# Patient Record
Sex: FEMALE | Race: BLACK OR AFRICAN AMERICAN | NOT HISPANIC OR LATINO | URBAN - METROPOLITAN AREA
[De-identification: names, ages, dates, MRNs, and addresses within clinical notes are randomized per-mention and may not be internally consistent; named-entity substitution may affect disease eponyms.]

---

## 2019-10-25 ENCOUNTER — INPATIENT (INPATIENT)
Facility: HOSPITAL | Age: 24
LOS: 5 days | Discharge: HOME | End: 2019-10-31
Attending: STUDENT IN AN ORGANIZED HEALTH CARE EDUCATION/TRAINING PROGRAM | Admitting: STUDENT IN AN ORGANIZED HEALTH CARE EDUCATION/TRAINING PROGRAM
Payer: SUBSIDIZED

## 2019-10-25 VITALS
SYSTOLIC BLOOD PRESSURE: 137 MMHG | WEIGHT: 179.9 LBS | RESPIRATION RATE: 19 BRPM | TEMPERATURE: 97 F | HEIGHT: 68 IN | OXYGEN SATURATION: 99 % | DIASTOLIC BLOOD PRESSURE: 84 MMHG | HEART RATE: 110 BPM

## 2019-10-25 LAB
ALBUMIN SERPL ELPH-MCNC: 4.1 G/DL — SIGNIFICANT CHANGE UP (ref 3.5–5.2)
ALP SERPL-CCNC: 70 U/L — SIGNIFICANT CHANGE UP (ref 30–115)
ALT FLD-CCNC: 10 U/L — SIGNIFICANT CHANGE UP (ref 0–41)
ANION GAP SERPL CALC-SCNC: 13 MMOL/L — SIGNIFICANT CHANGE UP (ref 7–14)
APTT BLD: 30.4 SEC — SIGNIFICANT CHANGE UP (ref 27–39.2)
AST SERPL-CCNC: 17 U/L — SIGNIFICANT CHANGE UP (ref 0–41)
BILIRUB SERPL-MCNC: 0.6 MG/DL — SIGNIFICANT CHANGE UP (ref 0.2–1.2)
BUN SERPL-MCNC: 9 MG/DL — LOW (ref 10–20)
CALCIUM SERPL-MCNC: 9.1 MG/DL — SIGNIFICANT CHANGE UP (ref 8.5–10.1)
CHLORIDE SERPL-SCNC: 100 MMOL/L — SIGNIFICANT CHANGE UP (ref 98–110)
CO2 SERPL-SCNC: 24 MMOL/L — SIGNIFICANT CHANGE UP (ref 17–32)
CREAT SERPL-MCNC: 0.6 MG/DL — LOW (ref 0.7–1.5)
D DIMER BLD IA.RAPID-MCNC: 779 NG/ML DDU — HIGH (ref 0–230)
ERYTHROCYTE [SEDIMENTATION RATE] IN BLOOD: 128 MM/HR — HIGH (ref 0–20)
GLUCOSE SERPL-MCNC: 89 MG/DL — SIGNIFICANT CHANGE UP (ref 70–99)
HCT VFR BLD CALC: 27.8 % — LOW (ref 37–47)
HGB BLD-MCNC: 8.6 G/DL — LOW (ref 12–16)
INR BLD: 1.62 RATIO — HIGH (ref 0.65–1.3)
LIDOCAIN IGE QN: 9 U/L — SIGNIFICANT CHANGE UP (ref 7–60)
MAGNESIUM SERPL-MCNC: 2 MG/DL — SIGNIFICANT CHANGE UP (ref 1.8–2.4)
MCHC RBC-ENTMCNC: 23 PG — LOW (ref 27–31)
MCHC RBC-ENTMCNC: 30.9 G/DL — LOW (ref 32–37)
MCV RBC AUTO: 74.3 FL — LOW (ref 81–99)
NRBC # BLD: 0 /100 WBCS — SIGNIFICANT CHANGE UP (ref 0–0)
NT-PROBNP SERPL-SCNC: 86 PG/ML — SIGNIFICANT CHANGE UP (ref 0–300)
PLATELET # BLD AUTO: 263 K/UL — SIGNIFICANT CHANGE UP (ref 130–400)
POTASSIUM SERPL-MCNC: 3.6 MMOL/L — SIGNIFICANT CHANGE UP (ref 3.5–5)
POTASSIUM SERPL-SCNC: 3.6 MMOL/L — SIGNIFICANT CHANGE UP (ref 3.5–5)
PROT SERPL-MCNC: 9.5 G/DL — HIGH (ref 6–8)
PROTHROM AB SERPL-ACNC: 18.5 SEC — HIGH (ref 9.95–12.87)
RBC # BLD: 3.74 M/UL — LOW (ref 4.2–5.4)
RBC # FLD: 15.5 % — HIGH (ref 11.5–14.5)
SODIUM SERPL-SCNC: 137 MMOL/L — SIGNIFICANT CHANGE UP (ref 135–146)
TROPONIN T SERPL-MCNC: <0.01 NG/ML — SIGNIFICANT CHANGE UP
TROPONIN T SERPL-MCNC: <0.01 NG/ML — SIGNIFICANT CHANGE UP
WBC # BLD: 7.95 K/UL — SIGNIFICANT CHANGE UP (ref 4.8–10.8)
WBC # FLD AUTO: 7.95 K/UL — SIGNIFICANT CHANGE UP (ref 4.8–10.8)

## 2019-10-25 PROCEDURE — 99285 EMERGENCY DEPT VISIT HI MDM: CPT

## 2019-10-25 PROCEDURE — 71275 CT ANGIOGRAPHY CHEST: CPT | Mod: 26

## 2019-10-25 PROCEDURE — 93010 ELECTROCARDIOGRAM REPORT: CPT

## 2019-10-25 PROCEDURE — 71046 X-RAY EXAM CHEST 2 VIEWS: CPT | Mod: 26

## 2019-10-25 RX ORDER — COLCHICINE 0.6 MG
0.6 TABLET ORAL
Refills: 0 | Status: DISCONTINUED | OUTPATIENT
Start: 2019-10-25 | End: 2019-10-26

## 2019-10-25 RX ORDER — MORPHINE SULFATE 50 MG/1
2 CAPSULE, EXTENDED RELEASE ORAL ONCE
Refills: 0 | Status: DISCONTINUED | OUTPATIENT
Start: 2019-10-25 | End: 2019-10-25

## 2019-10-25 RX ORDER — CHLORHEXIDINE GLUCONATE 213 G/1000ML
1 SOLUTION TOPICAL
Refills: 0 | Status: DISCONTINUED | OUTPATIENT
Start: 2019-10-25 | End: 2019-10-31

## 2019-10-25 RX ORDER — SODIUM CHLORIDE 9 MG/ML
1000 INJECTION, SOLUTION INTRAVENOUS ONCE
Refills: 0 | Status: COMPLETED | OUTPATIENT
Start: 2019-10-25 | End: 2019-10-25

## 2019-10-25 RX ORDER — LANOLIN ALCOHOL/MO/W.PET/CERES
5 CREAM (GRAM) TOPICAL AT BEDTIME
Refills: 0 | Status: DISCONTINUED | OUTPATIENT
Start: 2019-10-25 | End: 2019-10-31

## 2019-10-25 RX ORDER — MORPHINE SULFATE 50 MG/1
4 CAPSULE, EXTENDED RELEASE ORAL ONCE
Refills: 0 | Status: DISCONTINUED | OUTPATIENT
Start: 2019-10-25 | End: 2019-10-25

## 2019-10-25 RX ORDER — IBUPROFEN 200 MG
400 TABLET ORAL EVERY 6 HOURS
Refills: 0 | Status: DISCONTINUED | OUTPATIENT
Start: 2019-10-25 | End: 2019-10-25

## 2019-10-25 RX ORDER — KETOROLAC TROMETHAMINE 30 MG/ML
30 SYRINGE (ML) INJECTION ONCE
Refills: 0 | Status: DISCONTINUED | OUTPATIENT
Start: 2019-10-25 | End: 2019-10-25

## 2019-10-25 RX ORDER — IBUPROFEN 200 MG
600 TABLET ORAL THREE TIMES A DAY
Refills: 0 | Status: DISCONTINUED | OUTPATIENT
Start: 2019-10-25 | End: 2019-10-25

## 2019-10-25 RX ORDER — ENOXAPARIN SODIUM 100 MG/ML
40 INJECTION SUBCUTANEOUS DAILY
Refills: 0 | Status: DISCONTINUED | OUTPATIENT
Start: 2019-10-25 | End: 2019-10-26

## 2019-10-25 RX ORDER — PANTOPRAZOLE SODIUM 20 MG/1
40 TABLET, DELAYED RELEASE ORAL
Refills: 0 | Status: DISCONTINUED | OUTPATIENT
Start: 2019-10-25 | End: 2019-10-28

## 2019-10-25 RX ORDER — INDOMETHACIN 50 MG
50 CAPSULE ORAL THREE TIMES A DAY
Refills: 0 | Status: DISCONTINUED | OUTPATIENT
Start: 2019-10-25 | End: 2019-10-31

## 2019-10-25 RX ORDER — KETOROLAC TROMETHAMINE 30 MG/ML
15 SYRINGE (ML) INJECTION EVERY 6 HOURS
Refills: 0 | Status: DISCONTINUED | OUTPATIENT
Start: 2019-10-25 | End: 2019-10-25

## 2019-10-25 RX ORDER — INFLUENZA VIRUS VACCINE 15; 15; 15; 15 UG/.5ML; UG/.5ML; UG/.5ML; UG/.5ML
0.5 SUSPENSION INTRAMUSCULAR ONCE
Refills: 0 | Status: DISCONTINUED | OUTPATIENT
Start: 2019-10-25 | End: 2019-10-31

## 2019-10-25 RX ORDER — ONDANSETRON 8 MG/1
4 TABLET, FILM COATED ORAL ONCE
Refills: 0 | Status: COMPLETED | OUTPATIENT
Start: 2019-10-25 | End: 2019-10-25

## 2019-10-25 RX ORDER — PANTOPRAZOLE SODIUM 20 MG/1
40 TABLET, DELAYED RELEASE ORAL
Refills: 0 | Status: DISCONTINUED | OUTPATIENT
Start: 2019-10-25 | End: 2019-10-25

## 2019-10-25 RX ADMIN — Medication 600 MILLIGRAM(S): at 20:50

## 2019-10-25 RX ADMIN — Medication 50 MILLIGRAM(S): at 22:36

## 2019-10-25 RX ADMIN — Medication 600 MILLIGRAM(S): at 20:13

## 2019-10-25 RX ADMIN — MORPHINE SULFATE 2 MILLIGRAM(S): 50 CAPSULE, EXTENDED RELEASE ORAL at 12:02

## 2019-10-25 RX ADMIN — Medication 50 MILLIGRAM(S): at 22:00

## 2019-10-25 RX ADMIN — ONDANSETRON 4 MILLIGRAM(S): 8 TABLET, FILM COATED ORAL at 20:14

## 2019-10-25 RX ADMIN — SODIUM CHLORIDE 1000 MILLILITER(S): 9 INJECTION, SOLUTION INTRAVENOUS at 13:02

## 2019-10-25 RX ADMIN — PANTOPRAZOLE SODIUM 40 MILLIGRAM(S): 20 TABLET, DELAYED RELEASE ORAL at 20:14

## 2019-10-25 RX ADMIN — Medication 30 MILLIGRAM(S): at 15:56

## 2019-10-25 RX ADMIN — MORPHINE SULFATE 4 MILLIGRAM(S): 50 CAPSULE, EXTENDED RELEASE ORAL at 14:37

## 2019-10-25 RX ADMIN — SODIUM CHLORIDE 1000 MILLILITER(S): 9 INJECTION, SOLUTION INTRAVENOUS at 14:37

## 2019-10-25 RX ADMIN — MORPHINE SULFATE 2 MILLIGRAM(S): 50 CAPSULE, EXTENDED RELEASE ORAL at 12:32

## 2019-10-25 RX ADMIN — SODIUM CHLORIDE 1000 MILLILITER(S): 9 INJECTION, SOLUTION INTRAVENOUS at 12:02

## 2019-10-25 NOTE — ED PROVIDER NOTE - ADDITIONAL RISK FACTOR FREE TEXT BOX
24f w pericarditis hx and chest pain concerning for recurrence of pericarditis. no resp distress. BP ok. Bedside POC-US w no tamponade physiology. Labs, EKG, & imaging reviewed. IV fluids and analgesia given w improvement in symptoms.

## 2019-10-25 NOTE — CONSULT NOTE ADULT - SUBJECTIVE AND OBJECTIVE BOX
Date of Admission: 10/25/2019    CHIEF COMPLAINT: Chest pain     HISTORY OF PRESENT ILLNESS: 23 yo female  PMHx: Pericarditis  CC: Chest pain, dyspnea  History dates back to this AM when pt started having worsening chest pain. Pt started having chest pain last Thursday which got better and then worse this AM. Chest pain is constant, present on both sides, sharp, radiating to the back of the neck. Pain is aggravated by deep inspiration, lying down and movement. Pain is partially relieved by leaning forward and sitting down. Pain is associated with difficulty breathing and nausea. Pt also endorses having intermittent palpitations. Pt endorses symptoms of flu/upper respiratory tract infection 1 week ago. Pt felt feverish but not documented with chills. Pt also had episode of diarrhea with nausea 2 days ago. Pt has history of similar previous episodes. 1st episode was in december 2017 post viral syndrome, and pt was diagnosed in January 2018 with pericarditis at [facility], and she received Ibuprofen with prednisone for a few days. Since then pt has had similar episodes of lesser intensity. Last hospitalization in August 2018 for chest pain treated with ibuprofen and prednisone 2/2 pericarditis.   Pt denies abdominal pain, recent travel, night sweats, vision changes, headaches. Denies cough, dizziness, trauma to the chest/fall.   ER Course: Vitals were /84, , T 97F, SpO2 99% on RA. EKG showed sinus tachycardia. D-Dimer elevated. CT angio ruled out PE, but showed moderate pericardial effusion. Bedside US in showed moderate pericardial effusion, no R AV collapse or evidence of tamponade. (25 Oct 2019 17:19)      PAST MEDICAL & SURGICAL HISTORY:  Pericarditis  No significant past surgical history      FAMILY HISTORY:  [x] no pertinent family history of premature cardiovascular disease in first degree relatives.  Adopted    SOCIAL HISTORY:    [x] Non-smoker  [ ] Smoker  [ ] Alcohol    Allergies    No Known Allergies    Intolerances    	    REVIEW OF SYSTEMS:  CONSTITUTIONAL: denies fever, weight loss, or fatigue  CARDIOLOGY: see HPI.  RESPIRATORY: see HPI.   NEUROLOGICAL: denies weakness, no focal deficits to report.  ENDOCRINOLOGICAL: no recent change in diabetic medications.   GI: no BRBPR, no N,V, diarrhea.    PSYCHIATRY: normal mood and affect  HEENT: no nasal discharge, no ecchymosis  SKIN: no ecchymosis, no breakdown  MUSCULOSKELETAL: Full range of motion x4.     PHYSICAL EXAM:  T(C): 36.8 (10-25-19 @ 21:11), Max: 37.4 (10-25-19 @ 12:04)  HR: 114 (10-25-19 @ 21:11) (110 - 114)  BP: 110/62 (10-25-19 @ 21:11) (110/62 - 150/90)  RR: 20 (10-25-19 @ 21:11) (18 - 20)  SpO2: 100% (10-25-19 @ 20:07) (99% - 100%)  Wt(kg): --  I&O's Summary      General Appearance: well appearing, normal for age and gender. 	  Neck: normal JVP, no bruit.   Eyes: No xanthomalasia, Extra Ocular muscles intact.   Cardiovascular: regular rate and rhythm S1 S2, friction rub, No JVD, No murmurs, No edema  Respiratory: Lungs clear to auscultation	  Psychiatry: Alert and oriented x 3, Mood & affect appropriate  Gastrointestinal:  Soft, Non-tender  Skin/Integumen: No rashes, No ecchymoses, No cyanosis	  Neurologic: Non-focal  Musculoskeletal/extremities: Normal range of motion, No clubbing, cyanosis or edema  Vascular: Peripheral pulses palpable 2+ bilaterally    LABS:	 	                          8.6    7.95  )-----------( 263      ( 25 Oct 2019 12:12 )             27.8     10-25    137  |  100  |  9<L>  ----------------------------<  89  3.6   |  24  |  0.6<L>    Ca    9.1      25 Oct 2019 12:12  Mg     2.0     10-25    TPro  9.5<H>  /  Alb  4.1  /  TBili  0.6  /  DBili  x   /  AST  17  /  ALT  10  /  AlkPhos  70  10-25    CARDIAC MARKERS ( 25 Oct 2019 12:12 )  x     / <0.01 ng/mL / x     / x     / x          PT/INR - ( 25 Oct 2019 12:12 )   PT: 18.50 sec;   INR: 1.62 ratio         PTT - ( 25 Oct 2019 12:12 )  PTT:30.4 sec      TELEMETRY EVENTS: 	Sinus tachycardia    ECG:  	Sinus tachycardia. non-specific T-waves changes  RADIOLOGY: < from: CT Angio Chest w/ IV Cont (10.25.19 @ 14:28) >  IMPRESSION:    1.  No central or segmental pulmonary embolus.   2.  Moderate pericardial effusion.   Mildly enlarged mediastinal and bilateral axillary lymph nodes,   nonspecific - may be reactive or related to underlying inflammatory   etiology.Recommend followup chest CT after resolution of symptoms.    < end of copied text >      PREVIOUS DIAGNOSTIC TESTING:    [ ] Echocardiogram:  [ ] Catheterization:  [ ] Stress Test:  	  	    Home Medications:  ibuprofen 600 mg oral tablet: 1 tab(s) orally every 6 hours, As Needed (25 Oct 2019 18:25)    MEDICATIONS  (STANDING):  chlorhexidine 4% Liquid 1 Application(s) Topical <User Schedule>  colchicine 0.6 milliGRAM(s) Oral two times a day  enoxaparin Injectable 40 milliGRAM(s) SubCutaneous daily  indomethacin 50 milliGRAM(s) Oral three times a day  influenza   Vaccine 0.5 milliLiter(s) IntraMuscular once  pantoprazole    Tablet 40 milliGRAM(s) Oral two times a day    MEDICATIONS  (PRN): Date of Admission: 10/25/2019    CHIEF COMPLAINT: Chest pain     HISTORY OF PRESENT ILLNESS: 25 yo female  PMHx: Pericarditis  CC: Chest pain, dyspnea  History dates back to this AM when pt started having worsening chest pain. Pt started having chest pain last Thursday which got better and then worse this AM. Chest pain is constant, present on both sides, sharp, radiating to the back of the neck. Pain is aggravated by deep inspiration, lying down and movement. Pain is partially relieved by leaning forward and sitting down. Pain is associated with difficulty breathing and nausea. Pt also endorses having intermittent palpitations. Pt endorses symptoms of flu/upper respiratory tract infection 1 week ago. Pt felt feverish but not documented with chills. Pt also had episode of diarrhea with nausea 2 days ago. Pt has history of similar previous episodes. 1st episode was in december 2017 post viral syndrome, and pt was diagnosed in January 2018 with pericarditis at [facility], and she received Ibuprofen with prednisone for a few days. Since then pt has had similar episodes of lesser intensity. Last hospitalization in August 2018 for chest pain treated with ibuprofen and prednisone 2/2 pericarditis.   Pt denies abdominal pain, recent travel, night sweats, vision changes, headaches. Denies cough, dizziness, trauma to the chest/fall.   ER Course: Vitals were /84, , T 97F, SpO2 99% on RA. EKG showed sinus tachycardia. D-Dimer elevated. CT angio ruled out PE, but showed moderate pericardial effusion. Bedside US in showed moderate pericardial effusion, no RV collapse or evidence of tamponade. (25 Oct 2019 17:19)      PAST MEDICAL & SURGICAL HISTORY:  Pericarditis  No significant past surgical history      FAMILY HISTORY:  [x] no pertinent family history of premature cardiovascular disease in first degree relatives.  Adopted    SOCIAL HISTORY:    [x] Non-smoker  [ ] Smoker  [ ] Alcohol    Allergies    No Known Allergies    Intolerances    	    REVIEW OF SYSTEMS:  CONSTITUTIONAL: denies fever, weight loss, or fatigue  CARDIOLOGY: see HPI.  RESPIRATORY: see HPI.   NEUROLOGICAL: denies weakness, no focal deficits to report.  ENDOCRINOLOGICAL: no recent change in diabetic medications.   GI: no BRBPR, no N,V, diarrhea.    PSYCHIATRY: normal mood and affect  HEENT: no nasal discharge, no ecchymosis  SKIN: no ecchymosis, no breakdown  MUSCULOSKELETAL: Full range of motion x4.     PHYSICAL EXAM:  T(C): 36.8 (10-25-19 @ 21:11), Max: 37.4 (10-25-19 @ 12:04)  HR: 114 (10-25-19 @ 21:11) (110 - 114)  BP: 110/62 (10-25-19 @ 21:11) (110/62 - 150/90)  RR: 20 (10-25-19 @ 21:11) (18 - 20)  SpO2: 100% (10-25-19 @ 20:07) (99% - 100%)  Wt(kg): --  I&O's Summary      General Appearance: well appearing, normal for age and gender. 	  Neck: normal JVP, no bruit.   Eyes: No xanthomalasia, Extra Ocular muscles intact.   Cardiovascular: regular rate and rhythm S1 S2, friction rub, No JVD, No murmurs, No edema  Respiratory: Lungs clear to auscultation	  Psychiatry: Alert and oriented x 3, Mood & affect appropriate  Gastrointestinal:  Soft, Non-tender  Skin/Integumen: No rashes, No ecchymoses, No cyanosis	  Neurologic: Non-focal  Musculoskeletal/extremities: Normal range of motion, No clubbing, cyanosis or edema  Vascular: Peripheral pulses palpable 2+ bilaterally    LABS:	 	                          8.6    7.95  )-----------( 263      ( 25 Oct 2019 12:12 )             27.8     10-25    137  |  100  |  9<L>  ----------------------------<  89  3.6   |  24  |  0.6<L>    Ca    9.1      25 Oct 2019 12:12  Mg     2.0     10-25    TPro  9.5<H>  /  Alb  4.1  /  TBili  0.6  /  DBili  x   /  AST  17  /  ALT  10  /  AlkPhos  70  10-25    CARDIAC MARKERS ( 25 Oct 2019 12:12 )  x     / <0.01 ng/mL / x     / x     / x          PT/INR - ( 25 Oct 2019 12:12 )   PT: 18.50 sec;   INR: 1.62 ratio         PTT - ( 25 Oct 2019 12:12 )  PTT:30.4 sec      TELEMETRY EVENTS: 	Sinus tachycardia    ECG:  	Sinus tachycardia. non-specific T-waves changes  RADIOLOGY: < from: CT Angio Chest w/ IV Cont (10.25.19 @ 14:28) >  IMPRESSION:    1.  No central or segmental pulmonary embolus.   2.  Moderate pericardial effusion.   Mildly enlarged mediastinal and bilateral axillary lymph nodes,   nonspecific - may be reactive or related to underlying inflammatory   etiology.Recommend followup chest CT after resolution of symptoms.    < end of copied text >      PREVIOUS DIAGNOSTIC TESTING:    [ ] Echocardiogram:  [ ] Catheterization:  [ ] Stress Test:  	  	    Home Medications:  ibuprofen 600 mg oral tablet: 1 tab(s) orally every 6 hours, As Needed (25 Oct 2019 18:25)    MEDICATIONS  (STANDING):  chlorhexidine 4% Liquid 1 Application(s) Topical <User Schedule>  colchicine 0.6 milliGRAM(s) Oral two times a day  enoxaparin Injectable 40 milliGRAM(s) SubCutaneous daily  indomethacin 50 milliGRAM(s) Oral three times a day  influenza   Vaccine 0.5 milliLiter(s) IntraMuscular once  pantoprazole    Tablet 40 milliGRAM(s) Oral two times a day    MEDICATIONS  (PRN):

## 2019-10-25 NOTE — ED PROVIDER NOTE - CLINICAL SUMMARY MEDICAL DECISION MAKING FREE TEXT BOX
24f w pericarditis hx and chest pain concerning for recurrence of pericarditis. no resp distress. BP ok. Bedside POC-US w no tamponade physiology. Labs, EKG, & imaging reviewed. IV fluids and analgesia given w improvement in symptoms. Dimer elevated but CT neg for PE. Patient admitted to Tele for cardiac eval & further care/management.

## 2019-10-25 NOTE — H&P ADULT - ASSESSMENT
23 yo female with PMHx of pericarditis, presents for un resolving chest pain associated with difficulty breathing.    # 25 yo female with PMHx of pericarditis, presents for un resolving chest pain associated with difficulty breathing.    #B/L chest pain with difficulty breathing likely secondary to acute pericarditis  -Pt has hx of previous episodes. Pt has hx of viral syndrome 1 week ago.  - 25 yo female with PMHx of pericarditis, presents for un resolving chest pain associated with difficulty breathing.    #B/L chest pain with difficulty breathing likely secondary to acute pericarditis  #Hx of recurrent episodes of chest pain  -Pt has hx of previous episodes. Pt has hx of viral syndrome 1 week ago. Could be viral vs idiopathic. Rule out rheumatologic causes   -EKG showed sinus tachycardia; No ST-T changes. trop 1 set negative; D-Dimer elevated; CT angio showed moderate pericardial effusion (PE ruled out)  -Pt hemodynamically stable; No signs of tamponade on bedside US or clinically. Telemonitoring for now  -Will start on Ibuprofen 600mg TID (for 1-2 weeks with taper) with colchicine 0.6 mg bid for 3 months  -Will check ESR, CRP, MYA, TSH; F/U serial CE and EKG.   -Monitor vitals and look for signs of hemodynamic compromise. Might need pericardiocentesis urgently.   -Cardiology eval    #DVT ppx: Lovenox  #GI ppx: Will start on PPI for now   #Diet: DASH/regular  #Activity: Restrict strenuous activity; AAT  #CHG  #FULL code  #Dispo: pending; from home 23 yo female with PMHx of pericarditis, presents for un resolving chest pain associated with difficulty breathing.    #B/L chest pain with difficulty breathing likely secondary to acute pericarditis  #Hx of recurrent episodes of chest pain  -Pt has hx of previous episodes. Pt has hx of viral syndrome 1 week ago. Could be viral vs idiopathic. Rule out rheumatologic causes   -EKG showed sinus tachycardia; No ST-T changes. trop 1 set negative; D-Dimer elevated; CT angio showed moderate pericardial effusion (PE ruled out)  -Pt hemodynamically stable; No signs of tamponade on bedside US or clinically. Telemonitoring for now  -Will start on Ibuprofen 600mg TID (for 1-2 weeks with taper) with colchicine 0.6 mg bid for 3 months  -Will check ESR, CRP, MYA, TSH, HIV, Blood cx; low suspicion for TB. F/U serial CE and EKG.   -Monitor vitals and look for signs of hemodynamic compromise. Might need pericardiocentesis urgently.   -Cardiology eval    #DVT ppx: Lovenox  #GI ppx: Will start on PPI for now   #Diet: DASH/regular  #Activity: Restrict strenuous activity; AAT  #CHG  #FULL code  #Dispo: pending; from home

## 2019-10-25 NOTE — ED PROVIDER NOTE - CARE PLAN
Assessment and plan of treatment:	24f w pericarditis hx and chest pain concerning for recurrence of pericarditis. no resp distress. BP ok. --Bedside POC-US eval for tamponade, labs, EKG, CXR, IV fluids, Analgesia/antiemetics as needed, admit for cardiology eval Principal Discharge DX:	Pericarditis  Assessment and plan of treatment:	24f w pericarditis hx and chest pain concerning for recurrence of pericarditis. no resp distress. BP ok. --Bedside POC-US eval for tamponade, labs, EKG, CXR, IV fluids, Analgesia/antiemetics as needed, admit for cardiology eval

## 2019-10-25 NOTE — H&P ADULT - NSHPPHYSICALEXAM_GEN_ALL_CORE
PHYSICAL EXAM:  GENERAL: NAD, speaks in full sentences, no signs of respiratory distress  HEAD:  Atraumatic, Normocephalic  EYES: EOMI, PERRLA, conjunctiva and sclera clear  NECK: Supple, No JVD  CHEST/LUNG: Clear to auscultation bilaterally; No wheeze; No crackles; No accessory muscles used  HEART: Regular rate and rhythm; No murmurs;   ABDOMEN: Soft, Nontender, Nondistended; Bowel sounds present; No guarding  EXTREMITIES:  2+ Peripheral Pulses, No cyanosis or edema  PSYCH: AAOx3  NEUROLOGY: non-focal  SKIN: No rashes or lesions PHYSICAL EXAM:  GENERAL: Mild distress due to pain, speaks in full sentences, no signs of respiratory distress  HEAD:  Atraumatic, Normocephalic  EYES: EOMI, PERRLA, conjunctiva and sclera clear  NECK: Supple, No JVD  CHEST/LUNG: Clear to auscultation bilaterally; No wheeze; No crackles; No accessory muscles used  HEART: Regular rate and rhythm-tachycardic; No murmurs; Tenderness on chest palpation b/l  ABDOMEN: Soft, Nontender, Nondistended; Bowel sounds present; No guarding  EXTREMITIES:  2+ Peripheral Pulses, No cyanosis or edema  PSYCH: AAOx3  NEUROLOGY: non-focal  SKIN: No rashes or lesions

## 2019-10-25 NOTE — ED PROVIDER NOTE - OBJECTIVE STATEMENT
24f w a hx of pericarditis intermittent since this past January. Pt reports substernal chest pain and dyspnea w lightheadedness since yesterday. Symptoms are moderate, constant, no radiating, worse w lying back, and better w leaning forward. No recent travel/hosp/immobilization. No prior hx of CAD/DVT/PE, patient is adopted and does not know family hx. No OCP use. No use of cocaine/amphetamines. No recent dental/surgical procedures.

## 2019-10-25 NOTE — ED PROVIDER NOTE - PHYSICAL EXAMINATION
Physical Exam  General: Awake, alert, mild dist, WDWN, NCAT  Eyes: PERRL, EOMI, no icterus, lids and conjunctivae are normal  ENT: External inspection normal, pink/moist membranes, pharynx normal  CV: S1S2, mild tachycardia, regular rhythm, no murmur/gallops/rubs, no JVD, 2+ pulses b/l, no edema/cords/homans, warm/well-perfused  Respiratory: Mild tachypnea Normal respiratory effort, no respiratory distress, normal voice, speaking full sentences, lungs clear to auscultation b/l, no wheezing/rales/rhonchi, no retractions, no stridor  Abdomen: Soft abdomen, no tender/distended/guarding/rebound, no pulsatile mass, no CVA tender  Musculoskeletal: FROM all 4 extremities, N/V intact, +chest wall tender (no deform/flail/crepitus)  Neck: FROM neck, supple, no meningismus, trachea midline, no JVD  Integumentary: Color normal for race, warm and dry, no rash  Neuro: Oriented x3, CN 2-12 grossly intact, normal motor, normal sensory  Psych: Oriented x3, mood normal, affect normal Physical Exam  General: Awake, alert, mild dist, WDWN, NCAT  Eyes: PERRL, EOMI, no icterus, lids and conjunctivae are normal  ENT: External inspection normal, pink/moist membranes, pharynx normal  CV: S1S2, mild tachycardia, regular rhythm, no murmur/gallops/rubs, no JVD, 2+ pulses b/l, no edema/cords/homans, warm/well-perfused  Respiratory: Mild tachypnea, normal respiratory effort, no respiratory distress, normal voice, speaking full sentences, lungs clear to auscultation b/l, no wheezing/rales/rhonchi, no retractions, no stridor  Abdomen: Soft abdomen, no tender/distended/guarding/rebound, no pulsatile mass, no CVA tender  Musculoskeletal: FROM all 4 extremities, N/V intact, +chest wall tender (no deform/flail/crepitus)  Neck: FROM neck, supple, no meningismus, trachea midline, no JVD  Integumentary: Color normal for race, warm and dry, no rash  Neuro: Oriented x3, CN 2-12 grossly intact, normal motor, normal sensory  Psych: Oriented x3, mood normal, affect normal

## 2019-10-25 NOTE — ED ADULT NURSE NOTE - OBJECTIVE STATEMENT
pt with hx of pericarditis comes in with complaints of chest pain midsternal for 1 week. today it worsened. now radiating to her neck. having sob. back pain. took motrin 800mg. no relief. just moved so she does not have a doctor.

## 2019-10-25 NOTE — CONSULT NOTE ADULT - ASSESSMENT
Patient is a 24y old  Female who presents with a chief complaint of Chest pain/dyspnea (25 Oct 2019 17:19)    Thalassemia  Recurrent pericarditis: hemodynamically stable, no signs of tamponade on bedside echo.     Recommendations:   Admit to telemetry  CE, CRP and ESR  Serial echos  Indomethacin 50 mg q8h, colchicine 0.6 mg q12h, Protonix 40 mg q24h  MYA, HIV, blood cx if febrile

## 2019-10-25 NOTE — H&P ADULT - HISTORY OF PRESENT ILLNESS
23 yo female  PMHx: ?pericarditis  CC: Chest pain, dyspnea  History dates back to        ER Course: Vitals were /84, , T 97F, SpO2 99% on RA. EKG showed sinus tachycardia. D-Dimer elevated. CT angio ruled out PE, but showed moderate pericardial effusion. Bedside US in showed moderate pericardial effusion, no R AV collapse or evidence of tamponade. 25 yo female  PMHx: ?pericarditis  CC: Chest pain, dyspnea  History dates back to this AM when pt started having worsening chest pain. Pt started having chest pain last Thursday which got better and then worse this AM. Chest pain is constant, present on both sides, sharp, radiating to the back of the neck. Pain is aggravated by deep inspiration, lying down and movement. Pain is partially relieved by leaning forward and sitting down. Pain is associated with difficulty breathing and nausea. Pt also endorses having intermittent palpitations. Pt endorses symptoms of flu/upper respiratory tract infection 1 week ago. Pt felt feverish but not documented with chills. Pt also had episode of diarrhea with nausea 2 days ago. Pt has history of similar previous episodes. 1st episode was in december 2017 post viral syndrome, and pt was diagnosed in January 2018 with pericarditis at a [facility], and she received Ibuprofen with prednisone for a few days. Since then pt has had similar episodes of lesser intensity. Last hospitalization in August 2018 for chest pain treated with ibuprofen and prednisone 2/2 pericarditis.   Pt denies abdominal pain, recent travel, night sweats, vision changes, headaches. Denies cough, dizziness, trauma to the chest/fall.   ER Course: Vitals were /84, , T 97F, SpO2 99% on RA. EKG showed sinus tachycardia. D-Dimer elevated. CT angio ruled out PE, but showed moderate pericardial effusion. Bedside US in showed moderate pericardial effusion, no R AV collapse or evidence of tamponade. 23 yo female  PMHx: ?pericarditis  CC: Chest pain, dyspnea  History dates back to this AM when pt started having worsening chest pain. Pt started having chest pain last Thursday which got better and then worse this AM. Chest pain is constant, present on both sides, sharp, radiating to the back of the neck. Pain is aggravated by deep inspiration, lying down and movement. Pain is partially relieved by leaning forward and sitting down. Pain is associated with difficulty breathing and nausea. Pt also endorses having intermittent palpitations. Pt endorses symptoms of flu/upper respiratory tract infection 1 week ago. Pt felt feverish but not documented with chills. Pt also had episode of diarrhea with nausea 2 days ago. Pt has history of similar previous episodes. 1st episode was in december 2017 post viral syndrome, and pt was diagnosed in January 2018 with pericarditis at a [facility], and she received Ibuprofen with prednisone for a few days. Since then pt has had similar episodes of lesser intensity. Last hospitalization in August 2018 for chest pain treated with ibuprofen and prednisone 2/2 pericarditis.   Pt denies abdominal pain, recent travel, night sweats, vision changes, headaches. Denies cough, dizziness, trauma to the chest/fall.   ER Course: Vitals were /84, , T 97F, SpO2 99% on RA. EKG showed sinus tachycardia. D-Dimer elevated. CT angio ruled out PE, but showed moderate pericardial effusion. Bedside US in showed moderate pericardial effusion, no R AV collapse or evidence of tamponade.

## 2019-10-25 NOTE — ED PROVIDER NOTE - PLAN OF CARE
24f w pericarditis hx and chest pain concerning for recurrence of pericarditis. no resp distress. BP ok. --Bedside POC-US eval for tamponade, labs, EKG, CXR, IV fluids, Analgesia/antiemetics as needed, admit for cardiology eval

## 2019-10-25 NOTE — H&P ADULT - NSHPREVIEWOFSYSTEMS_GEN_ALL_CORE
REVIEW OF SYSTEMS    CONSTITUTIONAL: No weakness, fevers or chills  RESPIRATORY: No cough, wheezing, hemoptysis; No shortness of breath  CARDIOVASCULAR: No chest pain or palpitations  GASTROINTESTINAL: No abdominal or epigastric pain. No nausea, vomiting, or hematemesis; No diarrhea or constipation. No melena or hematochezia.  GENITOURINARY: No dysuria, frequency or hematuria  NEUROLOGICAL: No numbness or weakness  SKIN: No itching, rashes      Vital Signs Last 24 Hrs  T(C): 36.7 (25 Oct 2019 12:07), Max: 37.4 (25 Oct 2019 12:04)  T(F): 98.1 (25 Oct 2019 12:07), Max: 99.3 (25 Oct 2019 12:04)  HR: 112 (25 Oct 2019 12:07) (110 - 112)  BP: 150/90 (25 Oct 2019 12:07) (137/84 - 150/90)  RR: 18 (25 Oct 2019 12:07) (18 - 19)  SpO2: 99% (25 Oct 2019 12:07) (99% - 99%) REVIEW OF SYSTEMS    CONSTITUTIONAL: No weakness, fevers or chills  RESPIRATORY: No cough, wheezing, hemoptysis; + shortness of breath  CARDIOVASCULAR: + chest pain or palpitations  GASTROINTESTINAL: No abdominal or epigastric pain. +nausea; No vomiting, or hematemesis; No diarrhea or constipation. No melena or hematochezia.  GENITOURINARY: No dysuria, frequency or hematuria  NEUROLOGICAL: No numbness or weakness  SKIN: No itching, rashes      Vital Signs Last 24 Hrs  T(C): 36.7 (25 Oct 2019 12:07), Max: 37.4 (25 Oct 2019 12:04)  T(F): 98.1 (25 Oct 2019 12:07), Max: 99.3 (25 Oct 2019 12:04)  HR: 112 (25 Oct 2019 12:07) (110 - 112)  BP: 150/90 (25 Oct 2019 12:07) (137/84 - 150/90)  RR: 18 (25 Oct 2019 12:07) (18 - 19)  SpO2: 99% (25 Oct 2019 12:07) (99% - 99%)

## 2019-10-25 NOTE — H&P ADULT - ATTENDING COMMENTS
I saw and examined the patient independently. I agree with above history, physical exam and plan of care which I have reviewed and edited where appropriate with following additions.    patient c/o chest pain worsened by deep breathing and leaning forward/ seems little anxious/breathing little fast/ reports indocin and colchicine not helping pain much and taking percocet for pain now.     pleuritic chest pain and dyspnea likely due to recurrent pericarditis unclear etiology with pericardial effusion:   c/w telemonitoring.   NC O 2 to maintain SPO2>95%.  trops negative x 3.   TTE report noted - normal EF with small pericardial effusion.   c/w indocin/colchicine with protonix.   c/w percocet prn for pain.   Cardio following- fu recommendations with today's follow up.   send rheumatological perales for possible etiology of recurrent pericarditis.     acute on possible chronic microcytic anemia unclear etiology:   Hb on admission 8.6/ no baseline to compare.   Hb dropped to 6.9 today/transfuse one unit and fu post transfusion CBC/ transfuse to keep Hb >8.   monitor CBC q8hr.  send guiac test.   change protonix to 40mg bid.   DC  lovenox/ SCD's for dvt ppx.   monitor BP and for active bleeding.    start IVF hydration if BP drops <100/60 and with evidence of active bleeding.  keep 2 large bore peripheral IV accesses.   GI consult if guiac +ve with no improvement of H/H .     Progress note Handoff:   Pending: improvement of CP/ H/H / Cardio fu.   Discussion: patient   Disposition: home when stable

## 2019-10-25 NOTE — ED PROVIDER NOTE - NS ED ROS FT
Review of Systems  Constitutional:  No fever  Eyes:  Negative.   ENMT:  No further nasal congestion, discharge, or throat pain. URI symptoms last week now resolved.  Cardiac:  No palpitations, syncope, or edema. +Chest pain  Respiratory:  No stridor, wheezing, or cough. No hemoptysis. +Dyspnea  GI:  No vomiting, diarrhea, or abdominal pain. No melena or hematochezia.  :  No dysuria or hematuria.   Musculoskeletal:  No gait abnormality, joint swelling, joint pain, or back pain.  Skin:  No skin rash, jaundice, or lesions.  Neuro:  No headache, loss of sensation, or focal weakness.  No change in mental status.

## 2019-10-25 NOTE — H&P ADULT - NSHPLABSRESULTS_GEN_ALL_CORE
8.6    7.95  )-----------( 263      ( 25 Oct 2019 12:12 )             27.8       10-25    137  |  100  |  9<L>  ----------------------------<  89  3.6   |  24  |  0.6<L>    Ca    9.1      25 Oct 2019 12:12  Mg     2.0     10-25    TPro  9.5<H>  /  Alb  4.1  /  TBili  0.6  /  DBili  x   /  AST  17  /  ALT  10  /  AlkPhos  70  10-25    Serum Pro-Brain Natriuretic Peptide: 86 pg/mL (10.25.19 @ 12:12)  PT/INR - ( 25 Oct 2019 12:12 )   PT: 18.50 sec;   INR: 1.62 ratio    PTT - ( 25 Oct 2019 12:12 )  PTT:30.4 sec  D-Dimer Assay, Quantitative: 779    < from: 12 Lead ECG (10.25.19 @ 10:58) >    Ventricular Rate 116 BPM  Atrial Rate 116 BPM  P-R Interval 146 ms  QRS Duration 70 ms  Q-T Interval 320 ms  QTC Calculation(Bezet) 444 ms  P Axis 44 degrees  R Axis 45 degrees  T Axis 7 degrees    Diagnosis Line Sinus tachycardia  Possible Left atrial enlargement    < from: Xray Chest 2 Views PA/Lat (10.25.19 @ 13:38) >      Impression:      No consolidation, effusion or pneumothorax.  Cardiomegaly.    < from: CT Angio Chest w/ IV Cont (10.25.19 @ 14:28) >    IMPRESSION:    1.  No central or segmental pulmonary embolus.   2.  Moderate pericardial effusion.   Mildly enlarged mediastinal and bilateral axillary lymph nodes,   nonspecific - may be reactive or related to underlying inflammatory   etiology. Recommend followup chest CT after resolution of symptoms.

## 2019-10-26 LAB
ABO RH CONFIRMATION: SIGNIFICANT CHANGE UP
ALBUMIN SERPL ELPH-MCNC: 3 G/DL — LOW (ref 3.5–5.2)
ALP SERPL-CCNC: 61 U/L — SIGNIFICANT CHANGE UP (ref 30–115)
ALT FLD-CCNC: 10 U/L — SIGNIFICANT CHANGE UP (ref 0–41)
ANION GAP SERPL CALC-SCNC: 11 MMOL/L — SIGNIFICANT CHANGE UP (ref 7–14)
AST SERPL-CCNC: 16 U/L — SIGNIFICANT CHANGE UP (ref 0–41)
BASOPHILS # BLD AUTO: 0 K/UL — SIGNIFICANT CHANGE UP (ref 0–0.2)
BASOPHILS # BLD AUTO: 0.01 K/UL — SIGNIFICANT CHANGE UP (ref 0–0.2)
BASOPHILS NFR BLD AUTO: 0 % — SIGNIFICANT CHANGE UP (ref 0–1)
BASOPHILS NFR BLD AUTO: 0.2 % — SIGNIFICANT CHANGE UP (ref 0–1)
BILIRUB SERPL-MCNC: 0.4 MG/DL — SIGNIFICANT CHANGE UP (ref 0.2–1.2)
BLD GP AB SCN SERPL QL: SIGNIFICANT CHANGE UP
BUN SERPL-MCNC: 8 MG/DL — LOW (ref 10–20)
CALCIUM SERPL-MCNC: 8.7 MG/DL — SIGNIFICANT CHANGE UP (ref 8.5–10.1)
CHLORIDE SERPL-SCNC: 102 MMOL/L — SIGNIFICANT CHANGE UP (ref 98–110)
CO2 SERPL-SCNC: 24 MMOL/L — SIGNIFICANT CHANGE UP (ref 17–32)
CREAT SERPL-MCNC: 0.6 MG/DL — LOW (ref 0.7–1.5)
CRP SERPL-MCNC: 11.29 MG/DL — HIGH (ref 0–0.4)
EOSINOPHIL # BLD AUTO: 0.02 K/UL — SIGNIFICANT CHANGE UP (ref 0–0.7)
EOSINOPHIL # BLD AUTO: 0.05 K/UL — SIGNIFICANT CHANGE UP (ref 0–0.7)
EOSINOPHIL NFR BLD AUTO: 0.4 % — SIGNIFICANT CHANGE UP (ref 0–8)
EOSINOPHIL NFR BLD AUTO: 1.2 % — SIGNIFICANT CHANGE UP (ref 0–8)
ERYTHROCYTE [SEDIMENTATION RATE] IN BLOOD: 105 MM/HR — HIGH (ref 0–20)
GLUCOSE SERPL-MCNC: 87 MG/DL — SIGNIFICANT CHANGE UP (ref 70–99)
HCG UR QL: NEGATIVE — SIGNIFICANT CHANGE UP
HCT VFR BLD CALC: 22.4 % — LOW (ref 37–47)
HCT VFR BLD CALC: 23 % — LOW (ref 37–47)
HCT VFR BLD CALC: 26.8 % — LOW (ref 37–47)
HGB BLD-MCNC: 6.9 G/DL — CRITICAL LOW (ref 12–16)
HGB BLD-MCNC: 7.1 G/DL — LOW (ref 12–16)
HGB BLD-MCNC: 8.3 G/DL — LOW (ref 12–16)
HIV 1+2 AB+HIV1 P24 AG SERPL QL IA: SIGNIFICANT CHANGE UP
IMM GRANULOCYTES NFR BLD AUTO: 0.2 % — SIGNIFICANT CHANGE UP (ref 0.1–0.3)
IMM GRANULOCYTES NFR BLD AUTO: 0.6 % — HIGH (ref 0.1–0.3)
LYMPHOCYTES # BLD AUTO: 0.5 K/UL — LOW (ref 1.2–3.4)
LYMPHOCYTES # BLD AUTO: 0.53 K/UL — LOW (ref 1.2–3.4)
LYMPHOCYTES # BLD AUTO: 10.8 % — LOW (ref 20.5–51.1)
LYMPHOCYTES # BLD AUTO: 12.7 % — LOW (ref 20.5–51.1)
MCHC RBC-ENTMCNC: 23 PG — LOW (ref 27–31)
MCHC RBC-ENTMCNC: 23 PG — LOW (ref 27–31)
MCHC RBC-ENTMCNC: 23.8 PG — LOW (ref 27–31)
MCHC RBC-ENTMCNC: 30.8 G/DL — LOW (ref 32–37)
MCHC RBC-ENTMCNC: 30.9 G/DL — LOW (ref 32–37)
MCHC RBC-ENTMCNC: 31 G/DL — LOW (ref 32–37)
MCV RBC AUTO: 74.4 FL — LOW (ref 81–99)
MCV RBC AUTO: 74.7 FL — LOW (ref 81–99)
MCV RBC AUTO: 76.8 FL — LOW (ref 81–99)
MONOCYTES # BLD AUTO: 0.44 K/UL — SIGNIFICANT CHANGE UP (ref 0.1–0.6)
MONOCYTES # BLD AUTO: 0.46 K/UL — SIGNIFICANT CHANGE UP (ref 0.1–0.6)
MONOCYTES NFR BLD AUTO: 10.6 % — HIGH (ref 1.7–9.3)
MONOCYTES NFR BLD AUTO: 9.9 % — HIGH (ref 1.7–9.3)
NEUTROPHILS # BLD AUTO: 3.12 K/UL — SIGNIFICANT CHANGE UP (ref 1.4–6.5)
NEUTROPHILS # BLD AUTO: 3.62 K/UL — SIGNIFICANT CHANGE UP (ref 1.4–6.5)
NEUTROPHILS NFR BLD AUTO: 75.1 % — SIGNIFICANT CHANGE UP (ref 42.2–75.2)
NEUTROPHILS NFR BLD AUTO: 78.3 % — HIGH (ref 42.2–75.2)
NRBC # BLD: 0 /100 WBCS — SIGNIFICANT CHANGE UP (ref 0–0)
PLATELET # BLD AUTO: 209 K/UL — SIGNIFICANT CHANGE UP (ref 130–400)
PLATELET # BLD AUTO: 211 K/UL — SIGNIFICANT CHANGE UP (ref 130–400)
PLATELET # BLD AUTO: 230 K/UL — SIGNIFICANT CHANGE UP (ref 130–400)
POTASSIUM SERPL-MCNC: 4.3 MMOL/L — SIGNIFICANT CHANGE UP (ref 3.5–5)
POTASSIUM SERPL-SCNC: 4.3 MMOL/L — SIGNIFICANT CHANGE UP (ref 3.5–5)
PROT SERPL-MCNC: 7.7 G/DL — SIGNIFICANT CHANGE UP (ref 6–8)
RBC # BLD: 3 M/UL — LOW (ref 4.2–5.4)
RBC # BLD: 3.09 M/UL — LOW (ref 4.2–5.4)
RBC # BLD: 3.49 M/UL — LOW (ref 4.2–5.4)
RBC # FLD: 15.3 % — HIGH (ref 11.5–14.5)
RBC # FLD: 15.5 % — HIGH (ref 11.5–14.5)
RBC # FLD: 16.1 % — HIGH (ref 11.5–14.5)
SODIUM SERPL-SCNC: 137 MMOL/L — SIGNIFICANT CHANGE UP (ref 135–146)
TROPONIN T SERPL-MCNC: <0.01 NG/ML — SIGNIFICANT CHANGE UP
TSH SERPL-MCNC: 1.8 UIU/ML — SIGNIFICANT CHANGE UP (ref 0.27–4.2)
WBC # BLD: 4.16 K/UL — LOW (ref 4.8–10.8)
WBC # BLD: 4.63 K/UL — LOW (ref 4.8–10.8)
WBC # BLD: 5.56 K/UL — SIGNIFICANT CHANGE UP (ref 4.8–10.8)
WBC # FLD AUTO: 4.16 K/UL — LOW (ref 4.8–10.8)
WBC # FLD AUTO: 4.63 K/UL — LOW (ref 4.8–10.8)
WBC # FLD AUTO: 5.56 K/UL — SIGNIFICANT CHANGE UP (ref 4.8–10.8)

## 2019-10-26 PROCEDURE — 99223 1ST HOSP IP/OBS HIGH 75: CPT

## 2019-10-26 PROCEDURE — 99222 1ST HOSP IP/OBS MODERATE 55: CPT

## 2019-10-26 PROCEDURE — 93010 ELECTROCARDIOGRAM REPORT: CPT

## 2019-10-26 PROCEDURE — 93306 TTE W/DOPPLER COMPLETE: CPT | Mod: 26

## 2019-10-26 RX ORDER — OXYCODONE AND ACETAMINOPHEN 5; 325 MG/1; MG/1
1 TABLET ORAL EVERY 6 HOURS
Refills: 0 | Status: DISCONTINUED | OUTPATIENT
Start: 2019-10-26 | End: 2019-10-29

## 2019-10-26 RX ORDER — DIPHENHYDRAMINE HCL 50 MG
50 CAPSULE ORAL EVERY 6 HOURS
Refills: 0 | Status: DISCONTINUED | OUTPATIENT
Start: 2019-10-26 | End: 2019-10-26

## 2019-10-26 RX ORDER — COLCHICINE 0.6 MG
0.6 TABLET ORAL
Refills: 0 | Status: DISCONTINUED | OUTPATIENT
Start: 2019-10-26 | End: 2019-10-31

## 2019-10-26 RX ORDER — OXYCODONE AND ACETAMINOPHEN 5; 325 MG/1; MG/1
1 TABLET ORAL ONCE
Refills: 0 | Status: DISCONTINUED | OUTPATIENT
Start: 2019-10-26 | End: 2019-10-26

## 2019-10-26 RX ORDER — DIPHENHYDRAMINE HCL 50 MG
50 CAPSULE ORAL EVERY 4 HOURS
Refills: 0 | Status: DISCONTINUED | OUTPATIENT
Start: 2019-10-26 | End: 2019-10-31

## 2019-10-26 RX ORDER — PANTOPRAZOLE SODIUM 20 MG/1
40 TABLET, DELAYED RELEASE ORAL
Refills: 0 | Status: DISCONTINUED | OUTPATIENT
Start: 2019-10-26 | End: 2019-10-26

## 2019-10-26 RX ADMIN — Medication 50 MILLIGRAM(S): at 11:56

## 2019-10-26 RX ADMIN — OXYCODONE AND ACETAMINOPHEN 1 TABLET(S): 5; 325 TABLET ORAL at 10:22

## 2019-10-26 RX ADMIN — Medication 50 MILLIGRAM(S): at 21:29

## 2019-10-26 RX ADMIN — Medication 50 MILLIGRAM(S): at 15:28

## 2019-10-26 RX ADMIN — Medication 50 MILLIGRAM(S): at 05:35

## 2019-10-26 RX ADMIN — OXYCODONE AND ACETAMINOPHEN 1 TABLET(S): 5; 325 TABLET ORAL at 11:33

## 2019-10-26 RX ADMIN — Medication 5 MILLIGRAM(S): at 00:11

## 2019-10-26 RX ADMIN — Medication 0.6 MILLIGRAM(S): at 17:11

## 2019-10-26 RX ADMIN — Medication 0.6 MILLIGRAM(S): at 03:22

## 2019-10-26 RX ADMIN — Medication 50 MILLIGRAM(S): at 16:57

## 2019-10-26 RX ADMIN — OXYCODONE AND ACETAMINOPHEN 1 TABLET(S): 5; 325 TABLET ORAL at 03:58

## 2019-10-26 RX ADMIN — ENOXAPARIN SODIUM 40 MILLIGRAM(S): 100 INJECTION SUBCUTANEOUS at 11:57

## 2019-10-26 RX ADMIN — PANTOPRAZOLE SODIUM 40 MILLIGRAM(S): 20 TABLET, DELAYED RELEASE ORAL at 06:19

## 2019-10-26 RX ADMIN — PANTOPRAZOLE SODIUM 40 MILLIGRAM(S): 20 TABLET, DELAYED RELEASE ORAL at 17:11

## 2019-10-26 RX ADMIN — Medication 50 MILLIGRAM(S): at 22:35

## 2019-10-26 NOTE — PROGRESS NOTE ADULT - ASSESSMENT
25 yo female with PMHx of pericarditis, presents for un resolving chest pain associated with difficulty breathing.    B/L chest pain with difficulty breathing likely secondary to acute pericarditis  -Hx of recurrent episodes of chest pain  -Pt has hx of previous episodes. Pt has hx of viral syndrome 1 week ago. Could be viral vs idiopathic. Rule out rheumatologic causes   -EKG showed sinus tachycardia; No ST-T changes. trop 1 set negative; D-Dimer elevated; CT angio showed moderate pericardial effusion (PE ruled out)  -Pt hemodynamically stable; No signs of tamponade on bedside US or clinically. Telemonitoring for now  -Will start on Ibuprofen 600mg TID (for 1-2 weeks with taper) with colchicine 0.6 mg bid for 3 months  -Will check ESR, CRP, MYA, TSH, HIV, Blood cx; low suspicion for TB. F/U serial CE and EKG.   -Monitor vitals and look for signs of hemodynamic compromise. Might need pericardiocentesis urgently.   -Cardiology eval  -Continue with indomethacin and colchicine and add percocet  Q 6hours PRN       #DVT ppx: Lovenox  #GI ppx: Will start on PPI for now   #Diet: DASH/regular  #Activity: Restrict strenuous activity; AAT  #CHG  #FULL code  #Dispo: pending; from home

## 2019-10-26 NOTE — PROGRESS NOTE ADULT - SUBJECTIVE AND OBJECTIVE BOX
24H events:    Patient is a 24y old Female who presents with a chief complaint of Chest pain/dyspnea (25 Oct 2019 22:12)    Primary diagnosis of Pericarditis     Today is hospital day 1d.     PAST MEDICAL & SURGICAL HISTORY  Pericarditis  No significant past surgical history    SOCIAL HISTORY:  Negative for smoking/alcohol/drug use.     ALLERGIES:  No Known Allergies    MEDICATIONS:  STANDING MEDICATIONS  chlorhexidine 4% Liquid 1 Application(s) Topical <User Schedule>  colchicine 0.6 milliGRAM(s) Oral two times a day  enoxaparin Injectable 40 milliGRAM(s) SubCutaneous daily  indomethacin 50 milliGRAM(s) Oral three times a day  influenza   Vaccine 0.5 milliLiter(s) IntraMuscular once  pantoprazole    Tablet 40 milliGRAM(s) Oral two times a day    PRN MEDICATIONS  melatonin 5 milliGRAM(s) Oral at bedtime PRN  oxycodone    5 mG/acetaminophen 325 mG 1 Tablet(s) Oral every 6 hours PRN    VITALS:   T(F): 97  HR: 82  BP: 104/51  RR: 18  SpO2: 100%    LABS:                        7.1    4.63  )-----------( 211      ( 26 Oct 2019 05:59 )             23.0     10-26    137  |  102  |  8<L>  ----------------------------<  87  4.3   |  24  |  0.6<L>    Ca    8.7      26 Oct 2019 05:59  Mg     2.0     10-25    TPro  7.7  /  Alb  3.0<L>  /  TBili  0.4  /  DBili  x   /  AST  16  /  ALT  10  /  AlkPhos  61  10-26    PT/INR - ( 25 Oct 2019 12:12 )   PT: 18.50 sec;   INR: 1.62 ratio         PTT - ( 25 Oct 2019 12:12 )  PTT:30.4 sec      Troponin T, Serum: <0.01 ng/mL (10-26-19 @ 05:59)  Troponin T, Serum: <0.01 ng/mL (10-25-19 @ 21:55)  Sedimentation Rate, Erythrocyte: 128 mm/Hr <H> (10-25-19 @ 21:55)  Troponin T, Serum: <0.01 ng/mL (10-25-19 @ 12:12)      CARDIAC MARKERS ( 26 Oct 2019 05:59 )  x     / <0.01 ng/mL / x     / x     / x      CARDIAC MARKERS ( 25 Oct 2019 21:55 )  x     / <0.01 ng/mL / x     / x     / x      CARDIAC MARKERS ( 25 Oct 2019 12:12 )  x     / <0.01 ng/mL / x     / x     / x            PHYSICAL EXAM:    	GENERAL: Mild distress due to pain, speaks in full sentences, no signs of respiratory distress  	HEAD:  Atraumatic, Normocephalic  	EYES: EOMI, PERRLA, conjunctiva and sclera clear  	NECK: Supple, No JVD  	CHEST/LUNG: Clear to auscultation bilaterally; No wheeze; No crackles; No accessory muscles used  	HEART: Regular rate and rhythm-tachycardic; No murmurs; Tenderness on chest palpation b/l  	ABDOMEN: Soft, Nontender, Nondistended; Bowel sounds present; No guarding  	EXTREMITIES:  2+ Peripheral Pulses, No cyanosis or edema  	PSYCH: AAOx3  	NEUROLOGY: non-focal  SKIN: No rashes or lesions

## 2019-10-27 LAB
ANION GAP SERPL CALC-SCNC: 12 MMOL/L — SIGNIFICANT CHANGE UP (ref 7–14)
BASOPHILS # BLD AUTO: 0 K/UL — SIGNIFICANT CHANGE UP (ref 0–0.2)
BASOPHILS # BLD AUTO: 0.01 K/UL — SIGNIFICANT CHANGE UP (ref 0–0.2)
BASOPHILS NFR BLD AUTO: 0 % — SIGNIFICANT CHANGE UP (ref 0–1)
BASOPHILS NFR BLD AUTO: 0.2 % — SIGNIFICANT CHANGE UP (ref 0–1)
BUN SERPL-MCNC: 13 MG/DL — SIGNIFICANT CHANGE UP (ref 10–20)
CALCIUM SERPL-MCNC: 8.8 MG/DL — SIGNIFICANT CHANGE UP (ref 8.5–10.1)
CHLORIDE SERPL-SCNC: 100 MMOL/L — SIGNIFICANT CHANGE UP (ref 98–110)
CO2 SERPL-SCNC: 25 MMOL/L — SIGNIFICANT CHANGE UP (ref 17–32)
CREAT SERPL-MCNC: 0.7 MG/DL — SIGNIFICANT CHANGE UP (ref 0.7–1.5)
EOSINOPHIL # BLD AUTO: 0.02 K/UL — SIGNIFICANT CHANGE UP (ref 0–0.7)
EOSINOPHIL # BLD AUTO: 0.04 K/UL — SIGNIFICANT CHANGE UP (ref 0–0.7)
EOSINOPHIL NFR BLD AUTO: 0.4 % — SIGNIFICANT CHANGE UP (ref 0–8)
EOSINOPHIL NFR BLD AUTO: 1.1 % — SIGNIFICANT CHANGE UP (ref 0–8)
GLUCOSE SERPL-MCNC: 95 MG/DL — SIGNIFICANT CHANGE UP (ref 70–99)
HCT VFR BLD CALC: 24.8 % — LOW (ref 37–47)
HCT VFR BLD CALC: 26.3 % — LOW (ref 37–47)
HGB BLD-MCNC: 7.7 G/DL — LOW (ref 12–16)
HGB BLD-MCNC: 8.2 G/DL — LOW (ref 12–16)
IMM GRANULOCYTES NFR BLD AUTO: 0.2 % — SIGNIFICANT CHANGE UP (ref 0.1–0.3)
IMM GRANULOCYTES NFR BLD AUTO: 0.3 % — SIGNIFICANT CHANGE UP (ref 0.1–0.3)
LYMPHOCYTES # BLD AUTO: 0.37 K/UL — LOW (ref 1.2–3.4)
LYMPHOCYTES # BLD AUTO: 0.49 K/UL — LOW (ref 1.2–3.4)
LYMPHOCYTES # BLD AUTO: 13.1 % — LOW (ref 20.5–51.1)
LYMPHOCYTES # BLD AUTO: 7.9 % — LOW (ref 20.5–51.1)
MCHC RBC-ENTMCNC: 23.7 PG — LOW (ref 27–31)
MCHC RBC-ENTMCNC: 23.8 PG — LOW (ref 27–31)
MCHC RBC-ENTMCNC: 31 G/DL — LOW (ref 32–37)
MCHC RBC-ENTMCNC: 31.2 G/DL — LOW (ref 32–37)
MCV RBC AUTO: 76 FL — LOW (ref 81–99)
MCV RBC AUTO: 76.5 FL — LOW (ref 81–99)
MONOCYTES # BLD AUTO: 0.43 K/UL — SIGNIFICANT CHANGE UP (ref 0.1–0.6)
MONOCYTES # BLD AUTO: 0.44 K/UL — SIGNIFICANT CHANGE UP (ref 0.1–0.6)
MONOCYTES NFR BLD AUTO: 11.8 % — HIGH (ref 1.7–9.3)
MONOCYTES NFR BLD AUTO: 9.2 % — SIGNIFICANT CHANGE UP (ref 1.7–9.3)
NEUTROPHILS # BLD AUTO: 2.76 K/UL — SIGNIFICANT CHANGE UP (ref 1.4–6.5)
NEUTROPHILS # BLD AUTO: 3.84 K/UL — SIGNIFICANT CHANGE UP (ref 1.4–6.5)
NEUTROPHILS NFR BLD AUTO: 73.7 % — SIGNIFICANT CHANGE UP (ref 42.2–75.2)
NEUTROPHILS NFR BLD AUTO: 82.1 % — HIGH (ref 42.2–75.2)
NRBC # BLD: 0 /100 WBCS — SIGNIFICANT CHANGE UP (ref 0–0)
NRBC # BLD: 0 /100 WBCS — SIGNIFICANT CHANGE UP (ref 0–0)
PLATELET # BLD AUTO: 204 K/UL — SIGNIFICANT CHANGE UP (ref 130–400)
PLATELET # BLD AUTO: 241 K/UL — SIGNIFICANT CHANGE UP (ref 130–400)
POTASSIUM SERPL-MCNC: 4.5 MMOL/L — SIGNIFICANT CHANGE UP (ref 3.5–5)
POTASSIUM SERPL-SCNC: 4.5 MMOL/L — SIGNIFICANT CHANGE UP (ref 3.5–5)
RBC # BLD: 3.24 M/UL — LOW (ref 4.2–5.4)
RBC # BLD: 3.46 M/UL — LOW (ref 4.2–5.4)
RBC # FLD: 15.8 % — HIGH (ref 11.5–14.5)
RBC # FLD: 15.9 % — HIGH (ref 11.5–14.5)
SODIUM SERPL-SCNC: 137 MMOL/L — SIGNIFICANT CHANGE UP (ref 135–146)
WBC # BLD: 3.74 K/UL — LOW (ref 4.8–10.8)
WBC # BLD: 4.68 K/UL — LOW (ref 4.8–10.8)
WBC # FLD AUTO: 3.74 K/UL — LOW (ref 4.8–10.8)
WBC # FLD AUTO: 4.68 K/UL — LOW (ref 4.8–10.8)

## 2019-10-27 PROCEDURE — 99233 SBSQ HOSP IP/OBS HIGH 50: CPT

## 2019-10-27 RX ORDER — POLYETHYLENE GLYCOL 3350 17 G/17G
17 POWDER, FOR SOLUTION ORAL DAILY
Refills: 0 | Status: DISCONTINUED | OUTPATIENT
Start: 2019-10-27 | End: 2019-10-28

## 2019-10-27 RX ORDER — SENNA PLUS 8.6 MG/1
2 TABLET ORAL AT BEDTIME
Refills: 0 | Status: DISCONTINUED | OUTPATIENT
Start: 2019-10-27 | End: 2019-10-28

## 2019-10-27 RX ADMIN — Medication 50 MILLIGRAM(S): at 23:32

## 2019-10-27 RX ADMIN — Medication 50 MILLIGRAM(S): at 16:28

## 2019-10-27 RX ADMIN — CHLORHEXIDINE GLUCONATE 1 APPLICATION(S): 213 SOLUTION TOPICAL at 05:14

## 2019-10-27 RX ADMIN — Medication 0.6 MILLIGRAM(S): at 05:14

## 2019-10-27 RX ADMIN — Medication 50 MILLIGRAM(S): at 22:16

## 2019-10-27 RX ADMIN — Medication 50 MILLIGRAM(S): at 06:15

## 2019-10-27 RX ADMIN — OXYCODONE AND ACETAMINOPHEN 1 TABLET(S): 5; 325 TABLET ORAL at 06:15

## 2019-10-27 RX ADMIN — PANTOPRAZOLE SODIUM 40 MILLIGRAM(S): 20 TABLET, DELAYED RELEASE ORAL at 05:14

## 2019-10-27 RX ADMIN — Medication 0.6 MILLIGRAM(S): at 17:51

## 2019-10-27 RX ADMIN — Medication 50 MILLIGRAM(S): at 05:16

## 2019-10-27 RX ADMIN — PANTOPRAZOLE SODIUM 40 MILLIGRAM(S): 20 TABLET, DELAYED RELEASE ORAL at 17:51

## 2019-10-27 RX ADMIN — SENNA PLUS 2 TABLET(S): 8.6 TABLET ORAL at 22:16

## 2019-10-27 RX ADMIN — OXYCODONE AND ACETAMINOPHEN 1 TABLET(S): 5; 325 TABLET ORAL at 05:14

## 2019-10-27 RX ADMIN — Medication 50 MILLIGRAM(S): at 15:58

## 2019-10-27 RX ADMIN — Medication 50 MILLIGRAM(S): at 05:17

## 2019-10-27 NOTE — PROGRESS NOTE ADULT - SUBJECTIVE AND OBJECTIVE BOX
Progress Note:  Provider Speciality                            Hospitalist      ETHANSPECIAL REILLY [id number] 24y Female     CHIEF PRESENTING COMPLAINT:  Patient is a 24y old  Female who presents with a chief complaint of Chest pain/dyspnea (26 Oct 2019 10:33)        SUBJECTIVE:  Patient was seen and examined at bedside.   Reports persistent chest pain worsened with movement and deep breathing.   denies active GI bleeding or menorrhagia.   denies dyspnea- reports she is very active and athletic at baseline.   No significant overnight events reported.     HISTORY OF PRESENTING ILLNESS:  HPI:  23 yo female  PMHx: ?pericarditis  CC: Chest pain, dyspnea  History dates back to this AM when pt started having worsening chest pain. Pt started having chest pain last Thursday which got better and then worse this AM. Chest pain is constant, present on both sides, sharp, radiating to the back of the neck. Pain is aggravated by deep inspiration, lying down and movement. Pain is partially relieved by leaning forward and sitting down. Pain is associated with difficulty breathing and nausea. Pt also endorses having intermittent palpitations. Pt endorses symptoms of flu/upper respiratory tract infection 1 week ago. Pt felt feverish but not documented with chills. Pt also had episode of diarrhea with nausea 2 days ago. Pt has history of similar previous episodes. 1st episode was in december 2017 post viral syndrome, and pt was diagnosed in January 2018 with pericarditis at a [place] [facility], and she received Ibuprofen with prednisone for a few days. Since then pt has had similar episodes of lesser intensity. Last hospitalization in August 2018 for chest pain treated with ibuprofen and prednisone 2/2 pericarditis.   Pt denies abdominal pain, recent travel, night sweats, vision changes, headaches. Denies cough, dizziness, trauma to the chest/fall.   ER Course: Vitals were /84, , T 97F, SpO2 99% on RA. EKG showed sinus tachycardia. D-Dimer elevated. CT angio ruled out PE, but showed moderate pericardial effusion. Bedside US in showed moderate pericardial effusion, no R AV collapse or evidence of tamponade. (25 Oct 2019 17:19)        REVIEW OF SYSTEMS:    At least 10 systems were reviewed in ROS. All systems reviewed  are within normal limits except for the complaints as described in Subjective.    PAST MEDICAL & SURGICAL HISTORY:  PAST MEDICAL & SURGICAL HISTORY:  Pericarditis  No significant past surgical history          VITAL SIGNS:  Vital Signs Last 24 Hrs  T(C): 36.6 (27 Oct 2019 14:31), Max: 36.8 (26 Oct 2019 20:47)  T(F): 97.9 (27 Oct 2019 14:31), Max: 98.2 (26 Oct 2019 20:47)  HR: 111 (27 Oct 2019 14:31) (92 - 111)  BP: 117/71 (27 Oct 2019 14:31) (105/59 - 117/71)  BP(mean): --  RR: 18 (27 Oct 2019 14:31) (18 - 18)  SpO2: --          PHYSICAL EXAMINATION:    General: AAO x 3 young female,  Not in acute distress  HEENT:   EOMI, atraumatic  Heart: S1+S2 audible, RRR, no murmur  Lungs: bilateral  fair air entry, no wheezing, no crepitations.  Abdomen: Soft, non-tender, non-distended   CNS: AAO  , no focal deficits.  Extremities: 1+  b/l LE edema            CONSULTS:  Consultant(s) Notes Reviewed by me.   Care Discussed with Consultants/Other Providers where required.        MEDICATIONS:  MEDICATIONS  (STANDING):  chlorhexidine 4% Liquid 1 Application(s) Topical <User Schedule>  colchicine 0.6 milliGRAM(s) Oral two times a day  indomethacin 50 milliGRAM(s) Oral three times a day  influenza   Vaccine 0.5 milliLiter(s) IntraMuscular once  pantoprazole    Tablet 40 milliGRAM(s) Oral two times a day    MEDICATIONS  (PRN):  diphenhydrAMINE 50 milliGRAM(s) Oral every 4 hours PRN Rash and/or Itching  melatonin 5 milliGRAM(s) Oral at bedtime PRN Insomnia  oxycodone    5 mG/acetaminophen 325 mG 1 Tablet(s) Oral every 6 hours PRN Moderate Pain (4 - 6)      LABOROTORY DATA/MICROBIOLOGY/I & O's:                        8.2    4.68  )-----------( 241      ( 27 Oct 2019 05:59 )             26.3     10-27    137  |  100  |  13  ----------------------------<  95  4.5   |  25  |  0.7    Ca    8.8      27 Oct 2019 05:59    TPro  7.7  /  Alb  3.0<L>  /  TBili  0.4  /  DBili  x   /  AST  16  /  ALT  10  /  AlkPhos  61  10-26        CAPILLARY BLOOD GLUCOSE                    10-26-19 @ 07:01  -  10-27-19 @ 07:00  --------------------------------------------------------  IN: 250 mL / OUT: 0 mL / NET: 250 mL              ASSESSMENT:    23 yo female with PMHx of pericarditis, presents for un resolving chest pain associated with difficulty breathing.    pleuritic chest pain and dyspnea likely due to recurrent pericarditis unclear etiology with pericardial effusion:   c/w telemonitoring- no events noted.   comfortable on RA today morning, NC O 2 to maintain SPO2>95% PRN.   trops negative x 3.   TTE report noted - normal EF with small pericardial effusion.   c/w indocin/colchicine with protonix.   c/w percocet prn for pain as above meds were not helping chest pain much    Cardio follow up.   ESR elevated.   fu rest of the  rheumatological perales, MYA, RF, CRP, fu celiac serologies.     acute on possible chronic microcytic anemia unclear etiology:   Hb on admission 8.6/ no baseline to compare.   Hb improved to >8 sp one unit PRBC's yesterday.   transfuse PRN to keep Hb >8/ no need for transfusion today.   monitor CBC q12hr   send guiac test once has BM  c/w protonix  40mg bid.   c/w  SCD's for dvt ppx.   keep 2 large bore peripheral IV accesses.   GI consult if  Hb continues to drop persistenly    constipation:   not too good oral intake  start senna/miralax daily prn     Progress note Handoff:   Pending: improvement of CP/ H/H stabilization/ Cardio fu/Rheumatological blood perales results   Discussion: patient   Disposition: home when stable .           Progress note handoff:   pending:   disposition:   discussion: Progress Note:  Provider Speciality                            Hospitalist      ETHANSPECIAL REILLY [id number] 24y Female     CHIEF PRESENTING COMPLAINT:  Patient is a 24y old  Female who presents with a chief complaint of Chest pain/dyspnea (26 Oct 2019 10:33)        SUBJECTIVE:  Patient was seen and examined at bedside.   Reports persistent chest pain worsened with movement and deep breathing.   denies active GI bleeding or menorrhagia.   denies dyspnea- reports she is very active and athletic at baseline.   No significant overnight events reported.     HISTORY OF PRESENTING ILLNESS:  HPI:  25 yo female  PMHx: ?pericarditis  CC: Chest pain, dyspnea  History dates back to this AM when pt started having worsening chest pain. Pt started having chest pain last Thursday which got better and then worse this AM. Chest pain is constant, present on both sides, sharp, radiating to the back of the neck. Pain is aggravated by deep inspiration, lying down and movement. Pain is partially relieved by leaning forward and sitting down. Pain is associated with difficulty breathing and nausea. Pt also endorses having intermittent palpitations. Pt endorses symptoms of flu/upper respiratory tract infection 1 week ago. Pt felt feverish but not documented with chills. Pt also had episode of diarrhea with nausea 2 days ago. Pt has history of similar previous episodes. 1st episode was in december 2017 post viral syndrome, and pt was diagnosed in January 2018 with pericarditis at a [place] [facility], and she received Ibuprofen with prednisone for a few days. Since then pt has had similar episodes of lesser intensity. Last hospitalization in August 2018 for chest pain treated with ibuprofen and prednisone 2/2 pericarditis.   Pt denies abdominal pain, recent travel, night sweats, vision changes, headaches. Denies cough, dizziness, trauma to the chest/fall.   ER Course: Vitals were /84, , T 97F, SpO2 99% on RA. EKG showed sinus tachycardia. D-Dimer elevated. CT angio ruled out PE, but showed moderate pericardial effusion. Bedside US in showed moderate pericardial effusion, no R AV collapse or evidence of tamponade. (25 Oct 2019 17:19)        REVIEW OF SYSTEMS:    At least 10 systems were reviewed in ROS. All systems reviewed  are within normal limits except for the complaints as described in Subjective.    PAST MEDICAL & SURGICAL HISTORY:  PAST MEDICAL & SURGICAL HISTORY:  Pericarditis  No significant past surgical history          VITAL SIGNS:  Vital Signs Last 24 Hrs  T(C): 36.6 (27 Oct 2019 14:31), Max: 36.8 (26 Oct 2019 20:47)  T(F): 97.9 (27 Oct 2019 14:31), Max: 98.2 (26 Oct 2019 20:47)  HR: 111 (27 Oct 2019 14:31) (92 - 111)  BP: 117/71 (27 Oct 2019 14:31) (105/59 - 117/71)  BP(mean): --  RR: 18 (27 Oct 2019 14:31) (18 - 18)  SpO2: --          PHYSICAL EXAMINATION:    General: AAO x 3 young female,  Not in acute distress  HEENT:   EOMI, atraumatic  Heart: S1+S2 audible, RRR, no murmur  Lungs: bilateral  fair air entry, no wheezing, no crepitations.  Abdomen: Soft, non-tender, non-distended   CNS: AAO  , no focal deficits.  Extremities: 1+  b/l LE edema            CONSULTS:  Consultant(s) Notes Reviewed by me.   Care Discussed with Consultants/Other Providers where required.        MEDICATIONS:  MEDICATIONS  (STANDING):  chlorhexidine 4% Liquid 1 Application(s) Topical <User Schedule>  colchicine 0.6 milliGRAM(s) Oral two times a day  indomethacin 50 milliGRAM(s) Oral three times a day  influenza   Vaccine 0.5 milliLiter(s) IntraMuscular once  pantoprazole    Tablet 40 milliGRAM(s) Oral two times a day    MEDICATIONS  (PRN):  diphenhydrAMINE 50 milliGRAM(s) Oral every 4 hours PRN Rash and/or Itching  melatonin 5 milliGRAM(s) Oral at bedtime PRN Insomnia  oxycodone    5 mG/acetaminophen 325 mG 1 Tablet(s) Oral every 6 hours PRN Moderate Pain (4 - 6)      LABOROTORY DATA/MICROBIOLOGY/I & O's:                        8.2    4.68  )-----------( 241      ( 27 Oct 2019 05:59 )             26.3     10-27    137  |  100  |  13  ----------------------------<  95  4.5   |  25  |  0.7    Ca    8.8      27 Oct 2019 05:59    TPro  7.7  /  Alb  3.0<L>  /  TBili  0.4  /  DBili  x   /  AST  16  /  ALT  10  /  AlkPhos  61  10-26        CAPILLARY BLOOD GLUCOSE                    10-26-19 @ 07:01  -  10-27-19 @ 07:00  --------------------------------------------------------  IN: 250 mL / OUT: 0 mL / NET: 250 mL              ASSESSMENT:    25 yo female with PMHx of pericarditis, presents for un resolving chest pain associated with difficulty breathing.    pleuritic chest pain and dyspnea likely due to recurrent pericarditis unclear etiology with pericardial effusion:   c/w telemonitoring- no events noted.   comfortable on RA today morning, NC O 2 to maintain SPO2>95% PRN.   trops negative x 3.   TTE report noted - normal EF with small pericardial effusion.   c/w indocin/colchicine current doses  with protonix/ monitor renal function and LFT"s with colchicine    c/w percocet prn for pain as above meds were not helping chest pain much    Cardio follow up.   ESR elevated.   fu rest of the  rheumatological perales, MYA, RF, CRP, fu celiac serologies.     acute on possible chronic microcytic anemia unclear etiology:   Hb on admission 8.6/ no baseline to compare.   Hb improved to >8 sp one unit PRBC's yesterday.  transfuse PRN to keep Hb >8/ no need for transfusion today.   monitor CBC q12hr   GI consult if  Hb continues to drop persistently as patient is on NSAIDS and consider switching to prednisone with colchicine if Hb persistently dropping.  send guiac test once has BM  c/w protonix  40mg bid.   c/w  SCD's for dvt ppx.   keep 2 large bore peripheral IV accesses.       constipation:   not too good oral intake  start senna/miralax daily prn     Progress note Handoff:   Pending: improvement of CP/ H/H stabilization/ Cardio fu/Rheumatological blood perales results   Discussion: patient   Disposition: home when stable . Progress Note:  Provider Speciality                            Hospitalist      ETHANSPECIAL REILLY [id number] 24y Female     CHIEF PRESENTING COMPLAINT:  Patient is a 24y old  Female who presents with a chief complaint of Chest pain/dyspnea (26 Oct 2019 10:33)        SUBJECTIVE:  Patient was seen and examined at bedside.   Reports persistent chest pain worsened with deep breathing /improved by sitting up/leaning forward.  denies active GI bleeding or menorrhagia.   denies dyspnea- reports she is very active and athletic at baseline.   No significant overnight events reported.     HISTORY OF PRESENTING ILLNESS:  HPI:  25 yo female  PMHx: ?pericarditis  CC: Chest pain, dyspnea  History dates back to this AM when pt started having worsening chest pain. Pt started having chest pain last Thursday which got better and then worse this AM. Chest pain is constant, present on both sides, sharp, radiating to the back of the neck. Pain is aggravated by deep inspiration, lying down and movement. Pain is partially relieved by leaning forward and sitting down. Pain is associated with difficulty breathing and nausea. Pt also endorses having intermittent palpitations. Pt endorses symptoms of flu/upper respiratory tract infection 1 week ago. Pt felt feverish but not documented with chills. Pt also had episode of diarrhea with nausea 2 days ago. Pt has history of similar previous episodes. 1st episode was in december 2017 post viral syndrome, and pt was diagnosed in January 2018 with pericarditis at a [facility], and she received Ibuprofen with prednisone for a few days. Since then pt has had similar episodes of lesser intensity. Last hospitalization in August 2018 for chest pain treated with ibuprofen and prednisone 2/2 pericarditis.   Pt denies abdominal pain, recent travel, night sweats, vision changes, headaches. Denies cough, dizziness, trauma to the chest/fall.   ER Course: Vitals were /84, , T 97F, SpO2 99% on RA. EKG showed sinus tachycardia. D-Dimer elevated. CT angio ruled out PE, but showed moderate pericardial effusion. Bedside US in showed moderate pericardial effusion, no R AV collapse or evidence of tamponade. (25 Oct 2019 17:19)        REVIEW OF SYSTEMS:    At least 10 systems were reviewed in ROS. All systems reviewed  are within normal limits except for the complaints as described in Subjective.    PAST MEDICAL & SURGICAL HISTORY:  PAST MEDICAL & SURGICAL HISTORY:  Pericarditis  No significant past surgical history          VITAL SIGNS:  Vital Signs Last 24 Hrs  T(C): 36.6 (27 Oct 2019 14:31), Max: 36.8 (26 Oct 2019 20:47)  T(F): 97.9 (27 Oct 2019 14:31), Max: 98.2 (26 Oct 2019 20:47)  HR: 111 (27 Oct 2019 14:31) (92 - 111)  BP: 117/71 (27 Oct 2019 14:31) (105/59 - 117/71)  BP(mean): --  RR: 18 (27 Oct 2019 14:31) (18 - 18)  SpO2: --          PHYSICAL EXAMINATION:    General: AAO x 3 young female,  Not in acute distress  HEENT:   EOMI, atraumatic  Heart: S1+S2 audible, RRR, no murmur  Lungs: bilateral  fair air entry, no wheezing, no crepitations.  Abdomen: Soft, non-tender, non-distended   CNS: AAO  , no focal deficits.  Extremities: 1+  b/l LE edema            CONSULTS:  Consultant(s) Notes Reviewed by me.   Care Discussed with Consultants/Other Providers where required.        MEDICATIONS:  MEDICATIONS  (STANDING):  chlorhexidine 4% Liquid 1 Application(s) Topical <User Schedule>  colchicine 0.6 milliGRAM(s) Oral two times a day  indomethacin 50 milliGRAM(s) Oral three times a day  influenza   Vaccine 0.5 milliLiter(s) IntraMuscular once  pantoprazole    Tablet 40 milliGRAM(s) Oral two times a day    MEDICATIONS  (PRN):  diphenhydrAMINE 50 milliGRAM(s) Oral every 4 hours PRN Rash and/or Itching  melatonin 5 milliGRAM(s) Oral at bedtime PRN Insomnia  oxycodone    5 mG/acetaminophen 325 mG 1 Tablet(s) Oral every 6 hours PRN Moderate Pain (4 - 6)      LABOROTORY DATA/MICROBIOLOGY/I & O's:                        8.2    4.68  )-----------( 241      ( 27 Oct 2019 05:59 )             26.3     10-27    137  |  100  |  13  ----------------------------<  95  4.5   |  25  |  0.7    Ca    8.8      27 Oct 2019 05:59    TPro  7.7  /  Alb  3.0<L>  /  TBili  0.4  /  DBili  x   /  AST  16  /  ALT  10  /  AlkPhos  61  10-26        CAPILLARY BLOOD GLUCOSE                    10-26-19 @ 07:01  -  10-27-19 @ 07:00  --------------------------------------------------------  IN: 250 mL / OUT: 0 mL / NET: 250 mL              ASSESSMENT:    25 yo female with PMHx of pericarditis, presents for un resolving chest pain associated with difficulty breathing.    pleuritic chest pain and dyspnea due to recurrent pericarditis unclear etiology with pericardial effusion:  chest pain is typical for pericarditis    c/w telemonitoring- no events noted.   comfortable on RA today morning, NC O 2 to maintain SPO2>95% PRN.   trops negative x 3.   TTE report noted - normal EF with small pericardial effusion.   c/w indocin/colchicine current doses  with protonix/ monitor renal function and LFT"s with colchicine    c/w percocet prn for pain as above meds were not helping chest pain much    Cardio follow up.   ESR elevated.   fu rest of the  rheumatological perales, MYA, RF, CRP, fu celiac serologies.     acute on possible chronic microcytic anemia unclear etiology(possible differential could be GIB with PUD with use of  NSAIDS vs chronic rheumatological dz like lupus:   Hb on admission 8.6/ no baseline to compare.   Hb improved to >8 sp one unit PRBC's yesterday.  transfuse PRN to keep Hb >8/ no need for transfusion today.   monitor CBC q12hr   GI consult if  Hb continues to drop persistently as patient is on NSAIDS and consider switching to prednisone with colchicine if Hb persistently dropping.  send guiac test once has BM and monitor for GI bleed  c/w protonix  40mg bid.   c/w  SCD's for dvt ppx.   keep 2 large bore peripheral IV accesses.       constipation:   not too good oral intake  start senna/miralax daily prn     Progress note Handoff:   Pending: improvement of CP/ H/H stabilization/ Cardio fu/Rheumatological blood perales results   Discussion: patient   Disposition: home when stable . Progress Note:  Provider Speciality                            Hospitalist      ETHANSPECIAL REILLY MRN-[id number] 24y Female     CHIEF PRESENTING COMPLAINT:  Patient is a 24y old  Female who presents with a chief complaint of Chest pain/dyspnea (26 Oct 2019 10:33)        SUBJECTIVE:  Patient was seen and examined at bedside.   Reports persistent chest pain worsened with deep breathing /improved by sitting up/leaning forward.  denies active GI bleeding or menorrhagia.   denies dyspnea- reports she is very active and athletic at baseline.   No significant overnight events reported.     HISTORY OF PRESENTING ILLNESS:  HPI:  23 yo female  PMHx: ?pericarditis  CC: Chest pain, dyspnea  History dates back to this AM when pt started having worsening chest pain. Pt started having chest pain last Thursday which got better and then worse this AM. Chest pain is constant, present on both sides, sharp, radiating to the back of the neck. Pain is aggravated by deep inspiration, lying down and movement. Pain is partially relieved by leaning forward and sitting down. Pain is associated with difficulty breathing and nausea. Pt also endorses having intermittent palpitations. Pt endorses symptoms of flu/upper respiratory tract infection 1 week ago. Pt felt feverish but not documented with chills. Pt also had episode of diarrhea with nausea 2 days ago. Pt has history of similar previous episodes. 1st episode was in december 2017 post viral syndrome, and pt was diagnosed in January 2018 with pericarditis at a [facility], and she received Ibuprofen with prednisone for a few days. Since then pt has had similar episodes of lesser intensity. Last hospitalization in August 2018 for chest pain treated with ibuprofen and prednisone 2/2 pericarditis.   Pt denies abdominal pain, recent travel, night sweats, vision changes, headaches. Denies cough, dizziness, trauma to the chest/fall.   ER Course: Vitals were /84, , T 97F, SpO2 99% on RA. EKG showed sinus tachycardia. D-Dimer elevated. CT angio ruled out PE, but showed moderate pericardial effusion. Bedside US in showed moderate pericardial effusion, no R AV collapse or evidence of tamponade. (25 Oct 2019 17:19)        REVIEW OF SYSTEMS:    At least 10 systems were reviewed in ROS. All systems reviewed  are within normal limits except for the complaints as described in Subjective.    PAST MEDICAL & SURGICAL HISTORY:  PAST MEDICAL & SURGICAL HISTORY:  Pericarditis  No significant past surgical history          VITAL SIGNS:  Vital Signs Last 24 Hrs  T(C): 36.6 (27 Oct 2019 14:31), Max: 36.8 (26 Oct 2019 20:47)  T(F): 97.9 (27 Oct 2019 14:31), Max: 98.2 (26 Oct 2019 20:47)  HR: 111 (27 Oct 2019 14:31) (92 - 111)  BP: 117/71 (27 Oct 2019 14:31) (105/59 - 117/71)  BP(mean): --  RR: 18 (27 Oct 2019 14:31) (18 - 18)  SpO2: --          PHYSICAL EXAMINATION:    General: AAO x 3 young female,  Not in acute distress  HEENT:   EOMI, atraumatic  Heart: S1+S2 audible, RRR, no murmur  Lungs: bilateral  fair air entry, no wheezing, no crepitations.  Abdomen: Soft, non-tender, non-distended   CNS: AAO  , no focal deficits.  Extremities: 1+  b/l LE edema            CONSULTS:  Consultant(s) Notes Reviewed by me.   Care Discussed with Consultants/Other Providers where required.        MEDICATIONS:  MEDICATIONS  (STANDING):  chlorhexidine 4% Liquid 1 Application(s) Topical <User Schedule>  colchicine 0.6 milliGRAM(s) Oral two times a day  indomethacin 50 milliGRAM(s) Oral three times a day  influenza   Vaccine 0.5 milliLiter(s) IntraMuscular once  pantoprazole    Tablet 40 milliGRAM(s) Oral two times a day    MEDICATIONS  (PRN):  diphenhydrAMINE 50 milliGRAM(s) Oral every 4 hours PRN Rash and/or Itching  melatonin 5 milliGRAM(s) Oral at bedtime PRN Insomnia  oxycodone    5 mG/acetaminophen 325 mG 1 Tablet(s) Oral every 6 hours PRN Moderate Pain (4 - 6)      LABOROTORY DATA/MICROBIOLOGY/I & O's:                        8.2    4.68  )-----------( 241      ( 27 Oct 2019 05:59 )             26.3     10-27    137  |  100  |  13  ----------------------------<  95  4.5   |  25  |  0.7    Ca    8.8      27 Oct 2019 05:59    TPro  7.7  /  Alb  3.0<L>  /  TBili  0.4  /  DBili  x   /  AST  16  /  ALT  10  /  AlkPhos  61  10-26        CAPILLARY BLOOD GLUCOSE                    10-26-19 @ 07:01  -  10-27-19 @ 07:00  --------------------------------------------------------  IN: 250 mL / OUT: 0 mL / NET: 250 mL              ASSESSMENT:    23 yo female with PMHx of pericarditis, presents for un resolving chest pain associated with difficulty breathing.    pleuritic chest pain and dyspnea due to recurrent pericarditis unclear etiology with pericardial effusion:  chest pain is typical for pericarditis    c/w telemonitoring- no events noted.   comfortable on RA today morning, NC O 2 to maintain SPO2>95% PRN.   trops negative x 3.   TTE report noted - normal EF with small pericardial effusion.   c/w indocin/colchicine current doses  with protonix/ monitor renal function and LFT"s with colchicine    c/w percocet prn for pain as above meds were not helping chest pain much    Cardio follow up.   ESR elevated.   fu rest of the  rheumatological perales, MYA, RF, CRP, fu celiac serologies.     acute on possible chronic microcytic anemia unclear etiology(possible differential could be GIB with PUD with use of  NSAIDS vs chronic rheumatological dz like lupus:   Hb on admission 8.6/ no baseline to compare.   Hb improved to >8 sp one unit PRBC's yesterday.  transfuse PRN to keep Hb >8/ no need for transfusion today.   monitor CBC q12hr   GI consult if  Hb continues to drop persistently as patient is on NSAIDS and consider switching to prednisone with colchicine if Hb persistently dropping.  send guiac test once has BM and monitor for GI bleed  c/w protonix  40mg bid.   c/w  SCD's for dvt ppx.   keep 2 large bore peripheral IV accesses.     mildly enlarged mediastinal and b/l axillary lymph nodes on CT chest likely inflammatory:   fu CT chest after resolution of pericarditis as OP to confirm resolution.      constipation:   not too good oral intake  start senna/miralax daily prn     Progress note Handoff:   Pending: improvement of CP/ H/H stabilization/ Cardio fu/Rheumatological blood perales results   Discussion: patient   Disposition: home when stable .

## 2019-10-28 LAB
BLD GP AB SCN SERPL QL: SIGNIFICANT CHANGE UP
CRP SERPL-MCNC: 12.4 MG/DL — HIGH (ref 0–0.4)
HCT VFR BLD CALC: 25.7 % — LOW (ref 37–47)
HGB BLD-MCNC: 8.1 G/DL — LOW (ref 12–16)
MCHC RBC-ENTMCNC: 23.6 PG — LOW (ref 27–31)
MCHC RBC-ENTMCNC: 31.5 G/DL — LOW (ref 32–37)
MCV RBC AUTO: 74.9 FL — LOW (ref 81–99)
NRBC # BLD: 0 /100 WBCS — SIGNIFICANT CHANGE UP (ref 0–0)
PLATELET # BLD AUTO: 258 K/UL — SIGNIFICANT CHANGE UP (ref 130–400)
RBC # BLD: 3.43 M/UL — LOW (ref 4.2–5.4)
RBC # FLD: 15.9 % — HIGH (ref 11.5–14.5)
RHEUMATOID FACT SERPL-ACNC: 91 IU/ML — HIGH (ref 0–13)
WBC # BLD: 4.89 K/UL — SIGNIFICANT CHANGE UP (ref 4.8–10.8)
WBC # FLD AUTO: 4.89 K/UL — SIGNIFICANT CHANGE UP (ref 4.8–10.8)

## 2019-10-28 PROCEDURE — 93306 TTE W/DOPPLER COMPLETE: CPT | Mod: 26

## 2019-10-28 PROCEDURE — 93010 ELECTROCARDIOGRAM REPORT: CPT

## 2019-10-28 PROCEDURE — 99233 SBSQ HOSP IP/OBS HIGH 50: CPT

## 2019-10-28 RX ORDER — PANTOPRAZOLE SODIUM 20 MG/1
40 TABLET, DELAYED RELEASE ORAL
Refills: 0 | Status: DISCONTINUED | OUTPATIENT
Start: 2019-10-28 | End: 2019-10-31

## 2019-10-28 RX ORDER — MECLIZINE HCL 12.5 MG
25 TABLET ORAL ONCE
Refills: 0 | Status: COMPLETED | OUTPATIENT
Start: 2019-10-28 | End: 2019-10-28

## 2019-10-28 RX ADMIN — Medication 50 MILLIGRAM(S): at 06:12

## 2019-10-28 RX ADMIN — Medication 50 MILLIGRAM(S): at 23:19

## 2019-10-28 RX ADMIN — Medication 50 MILLIGRAM(S): at 15:00

## 2019-10-28 RX ADMIN — Medication 50 MILLIGRAM(S): at 06:49

## 2019-10-28 RX ADMIN — PANTOPRAZOLE SODIUM 40 MILLIGRAM(S): 20 TABLET, DELAYED RELEASE ORAL at 06:12

## 2019-10-28 RX ADMIN — Medication 50 MILLIGRAM(S): at 22:02

## 2019-10-28 RX ADMIN — Medication 50 MILLIGRAM(S): at 14:59

## 2019-10-28 RX ADMIN — Medication 0.6 MILLIGRAM(S): at 18:17

## 2019-10-28 RX ADMIN — OXYCODONE AND ACETAMINOPHEN 1 TABLET(S): 5; 325 TABLET ORAL at 06:49

## 2019-10-28 RX ADMIN — Medication 0.6 MILLIGRAM(S): at 06:12

## 2019-10-28 RX ADMIN — Medication 25 MILLIGRAM(S): at 19:14

## 2019-10-28 RX ADMIN — OXYCODONE AND ACETAMINOPHEN 1 TABLET(S): 5; 325 TABLET ORAL at 06:12

## 2019-10-28 NOTE — PROGRESS NOTE ADULT - ATTENDING COMMENTS
I saw and examined the patient independently. I agree with above history, physical exam and plan of care which I have reviewed and edited where appropriate with following additions.    patient felt very dizzy today morning walking to bathroom along with dyspnea / feels dizzy even at rest. /cw persistent pleuritic CP with borderline sinus tachycardia in the morning.    recurrent pericarditis:   c/w telemonitor/ tachycardia   c/w current indocin and colchicine doses with protonix 40mg daily.   repeat TTE today as patient was dyspneic today with sinus tachycardia with pleuritic chest pain.   talked to patient - avoid percocet due to possibility of contributing to dizziness - patient not dependent on narcotics.   try tylenol prn as addition to above meds for pain.     acute on chronic microcytic anemia likely due to underlying thalassemia( patient friend at bedside reported today she has thalassemia).   repeat H/H today - pending.   avoid further transfusion if Hb around 8- likely patient's baseline.      Progress note Handoff:   Pending: clinical symptom's improvement / repeat TTE/ H/H today   Discussion: patient/  satisfied- all questions answered.  Disposition: patient and friend at bedside I saw and examined the patient independently. I agree with above history, physical exam and plan of care which I have reviewed and edited where appropriate with following additions.    patient felt very dizzy today morning walking to bathroom along with dyspnea / feels dizzy even at rest. /cw persistent pleuritic CP with borderline sinus tachycardia in the morning.    recurrent pericarditis:   c/w telemonitor/ tachycardia   c/w current indocin and colchicine doses with protonix 40mg daily.   repeat TTE today as patient was dyspneic today with sinus tachycardia with pleuritic chest pain.   talked to patient - avoid percocet due to possibility of contributing to dizziness - patient not dependent on narcotics.   try tylenol prn as addition to above meds for pain.     acute on chronic microcytic anemia likely due to underlying thalassemia( patient friend at bedside reported today she has thalassemia).   repeat H/H today - pending/discussed with resident to obtain labs today  avoid further transfusion if Hb around 8- likely patient's baseline.      Progress note Handoff:   Pending: clinical symptom's improvement / repeat TTE/ H/H today   Discussion: patient/  satisfied- all questions answered.  Disposition: patient and friend at bedside

## 2019-10-28 NOTE — PROGRESS NOTE ADULT - ASSESSMENT
23 yo female with PMHx of pericarditis, presents for unresolving chest pain associated with difficulty breathing.    # Pleuritic chest pain and dyspnea due to recurrent pericarditis - unclear etiology with pericardial effusion  - chest pain is typical for pericarditis. Had an episode of chest pain this morning. EKG done. Showing sinus tachycardia   - c/w telemonitoring  - comfortable on room air. Saturating at 97%. NC to maintain SPO2>95% PRN.   - trops negative x 3  - TTE report noted - normal EF with small pericardial effusion.   - c/w indomethacin and colchicine at current doses with protonix. Will monitor renal function and LFT"s with colchicine    - c/w percocet prn for pain as above meds were not helping chest pain much    - Cardio follow up  - ESR elevated - 105  - Awaiting result of rheumatology workup MYA, RF, CRP and celiac serologies    # Acute on possible chronic microcytic anemia unclear etiology (possible differential could be GIB with PUD with use of NSAIDS vs chronic rheumatological disease like lupus)   - Hb on admission 8.6. Now 8.2  - Hb improved to >8 sp one unit PRBC's   - transfuse PRN to keep Hb >8/ no need for transfusion today.   - monitor CBC q12hr   - GI consult if  Hb continues to drop persistently as patient is on NSAIDS and consider switching to prednisone with colchicine if Hb persistently dropping.  - send guaiac test once has BM and monitor for GI bleed  - c/w Protonix 40mg BID   - keep 2 large bore peripheral IV accesses.     # mildly enlarged mediastinal and b/l axillary lymph nodes on CT chest likely inflammatory   - follow up CT chest after resolution of pericarditis as OP to confirm resolution    # constipation  - senna/miralax daily prn       Disposition: home when stable . 25 yo female with PMHx of pericarditis, presents for unresolving chest pain associated with difficulty breathing.    # Pleuritic chest pain and dyspnea due to recurrent pericarditis unclear etiology possible viral etiology  with pericardial effusion  - chest pain is typical for pericarditis. Had an episode of chest pain this morning. EKG done. Showing sinus tachycardia   - c/w telemonitoring  - comfortable on room air. Saturating at 97%. NC to maintain SPO2>95% PRN.   - trops negative x 3  - TTE report noted - normal EF with small pericardial effusion.   - c/w indomethacin and colchicine at current doses with protonix. Will monitor renal function and LFT"s with colchicine    - c/w percocet prn for pain as above meds were not helping chest pain much    - Cardio follow up  - ESR elevated - 105  - Awaiting result of rheumatology workup MYA, RF, CRP and celiac serologies    # Acute on possible chronic microcytic anemia unclear etiology (possible differential could be GIB with PUD with use of NSAIDS vs chronic rheumatological disease like lupus)   - Hb on admission 8.6. Now 8.2  - Hb improved to >8 sp one unit PRBC's   - transfuse PRN to keep Hb >8/ no need for transfusion today.   - monitor CBC q12hr   - GI consult if  Hb continues to drop persistently as patient is on NSAIDS and consider switching to prednisone with colchicine if Hb persistently dropping.  - send guaiac test once has BM and monitor for GI bleed  - c/w Protonix 40mg BID   - keep 2 large bore peripheral IV accesses.     # mildly enlarged mediastinal and b/l axillary lymph nodes on CT chest likely inflammatory   - follow up CT chest after resolution of pericarditis as OP to confirm resolution    # constipation  - senna/miralax daily prn       Disposition: home when stable .

## 2019-10-28 NOTE — PROGRESS NOTE ADULT - SUBJECTIVE AND OBJECTIVE BOX
SUBJECTIVE:    Patient is a 24y old Female who presents with a chief complaint of Chest pain/dyspnea    Currently admitted to medicine with the primary diagnosis of Pericarditis     Today is hospital day 3d. This morning she is resting comfortably in bed and reports no new issues or overnight events.     PAST MEDICAL & SURGICAL HISTORY  Pericarditis  No significant past surgical history      ALLERGIES:  No Known Allergies    MEDICATIONS:  STANDING MEDICATIONS  chlorhexidine 4% Liquid 1 Application(s) Topical <User Schedule>  colchicine 0.6 milliGRAM(s) Oral two times a day  indomethacin 50 milliGRAM(s) Oral three times a day  influenza   Vaccine 0.5 milliLiter(s) IntraMuscular once  pantoprazole    Tablet 40 milliGRAM(s) Oral two times a day  senna 2 Tablet(s) Oral at bedtime    PRN MEDICATIONS  diphenhydrAMINE 50 milliGRAM(s) Oral every 4 hours PRN  melatonin 5 milliGRAM(s) Oral at bedtime PRN  oxycodone    5 mG/acetaminophen 325 mG 1 Tablet(s) Oral every 6 hours PRN  polyethylene glycol 3350 17 Gram(s) Oral daily PRN    VITALS:   T(F): 97.5  HR: 102  BP: 115/67  RR: 18  SpO2: 98%    LABS:                        8.2    4.68  )-----------( 241      ( 27 Oct 2019 05:59 )             26.3     10-27    137  |  100  |  13  ----------------------------<  95  4.5   |  25  |  0.7    Ca    8.8      27 Oct 2019 05:59                Culture - Blood (collected 26 Oct 2019 05:59)  Source: .Blood None  Preliminary Report (27 Oct 2019 11:00):    No growth to date.      PHYSICAL EXAM:  GEN: Episodes of chest pain causing acute distress   LUNGS: Clear to auscultation bilaterally   HEART: S1/S2 present. RRR.   ABD: Soft  EXT: Moves all extremities. No gross motor deficit   NEURO: AAOX3

## 2019-10-28 NOTE — PROGRESS NOTE ADULT - SUBJECTIVE AND OBJECTIVE BOX
SUBJECTIVE:    CP mildly improved.    Lightheaded today.  Hemodynamics stable.  Repeat ECHO demonstrated essentially stable pericardial effusion without tamponade physiology.    VITALS:  T(C): 36.4 (10-28-19 @ 04:45), Max: 36.4 (10-28-19 @ 04:45)  HR: 102 (10-28-19 @ 04:45) (102 - 102)  BP: 115/67 (10-28-19 @ 04:45) (115/67 - 115/67)  RR: 18 (10-28-19 @ 04:45) (18 - 18)  SpO2: 98% (10-28-19 @ 06:24) (98% - 98%)    PHYSICAL:  Alert, no distress  No JVD  Reg, nL s1/s2, no m/r/g  CTA  Warm, no edema    LABS:                        8.2    4.68  )-----------( 241      ( 27 Oct 2019 05:59 )             26.3     10-27    137  |  100  |  13  ----------------------------<  95  4.5   |  25  |  0.7    Ca    8.8      27 Oct 2019 05:59    MEDICATIONS  (STANDING):  chlorhexidine 4% Liquid 1 Application(s) Topical <User Schedule>  colchicine 0.6 milliGRAM(s) Oral two times a day  indomethacin 50 milliGRAM(s) Oral three times a day  influenza   Vaccine 0.5 milliLiter(s) IntraMuscular once  pantoprazole    Tablet 40 milliGRAM(s) Oral before breakfast    IMPRESSION:  1) Recurrent Pericarditis:  - Hemodynamics stable.    ESR / CRP and RF are markedly elevated.  Mild leukopenia.  Microcytic anemia with increased RDW not clearly secondary to reported possible thalassemia.  No reported GI / excessive GYN bleeding  Nonspecific adenopathy on CT.    Overall, concerning that pericarditis is a component of a systemic process / autoimmune disease.    RECOMMEND:  - IVF  - Cont Indomethacin / Colchicine.  - Rheumatology and Hematology consult.    Discussed with House Staff and Dr. Mchugh.

## 2019-10-29 DIAGNOSIS — R76.8 OTHER SPECIFIED ABNORMAL IMMUNOLOGICAL FINDINGS IN SERUM: ICD-10-CM

## 2019-10-29 DIAGNOSIS — D50.9 IRON DEFICIENCY ANEMIA, UNSPECIFIED: ICD-10-CM

## 2019-10-29 DIAGNOSIS — I31.9 DISEASE OF PERICARDIUM, UNSPECIFIED: ICD-10-CM

## 2019-10-29 DIAGNOSIS — R19.7 DIARRHEA, UNSPECIFIED: ICD-10-CM

## 2019-10-29 DIAGNOSIS — R79.82 ELEVATED C-REACTIVE PROTEIN (CRP): ICD-10-CM

## 2019-10-29 LAB
ALBUMIN SERPL ELPH-MCNC: 2.7 G/DL — LOW (ref 3.5–5.2)
ALP SERPL-CCNC: 65 U/L — SIGNIFICANT CHANGE UP (ref 30–115)
ALT FLD-CCNC: 12 U/L — SIGNIFICANT CHANGE UP (ref 0–41)
ANION GAP SERPL CALC-SCNC: 11 MMOL/L — SIGNIFICANT CHANGE UP (ref 7–14)
APPEARANCE UR: CLEAR — SIGNIFICANT CHANGE UP
AST SERPL-CCNC: 17 U/L — SIGNIFICANT CHANGE UP (ref 0–41)
BILIRUB SERPL-MCNC: 0.2 MG/DL — SIGNIFICANT CHANGE UP (ref 0.2–1.2)
BILIRUB UR-MCNC: NEGATIVE — SIGNIFICANT CHANGE UP
BUN SERPL-MCNC: 11 MG/DL — SIGNIFICANT CHANGE UP (ref 10–20)
CALCIUM SERPL-MCNC: 8.5 MG/DL — SIGNIFICANT CHANGE UP (ref 8.5–10.1)
CHLORIDE SERPL-SCNC: 103 MMOL/L — SIGNIFICANT CHANGE UP (ref 98–110)
CO2 SERPL-SCNC: 23 MMOL/L — SIGNIFICANT CHANGE UP (ref 17–32)
COLOR SPEC: SIGNIFICANT CHANGE UP
CREAT SERPL-MCNC: 0.7 MG/DL — SIGNIFICANT CHANGE UP (ref 0.7–1.5)
DIFF PNL FLD: NEGATIVE — SIGNIFICANT CHANGE UP
GLUCOSE BLDC GLUCOMTR-MCNC: 130 MG/DL — HIGH (ref 70–99)
GLUCOSE SERPL-MCNC: 92 MG/DL — SIGNIFICANT CHANGE UP (ref 70–99)
GLUCOSE UR QL: NEGATIVE — SIGNIFICANT CHANGE UP
HCT VFR BLD CALC: 23.8 % — LOW (ref 37–47)
HCT VFR BLD CALC: 24.6 % — LOW (ref 37–47)
HGB BLD-MCNC: 7.5 G/DL — LOW (ref 12–16)
HGB BLD-MCNC: 7.7 G/DL — LOW (ref 12–16)
IRON SATN MFR SERPL: 15 UG/DL — LOW (ref 35–150)
KETONES UR-MCNC: NEGATIVE — SIGNIFICANT CHANGE UP
LDH SERPL L TO P-CCNC: 149 — SIGNIFICANT CHANGE UP (ref 50–242)
LEUKOCYTE ESTERASE UR-ACNC: NEGATIVE — SIGNIFICANT CHANGE UP
MAGNESIUM SERPL-MCNC: 1.9 MG/DL — SIGNIFICANT CHANGE UP (ref 1.8–2.4)
MCHC RBC-ENTMCNC: 23.6 PG — LOW (ref 27–31)
MCHC RBC-ENTMCNC: 23.6 PG — LOW (ref 27–31)
MCHC RBC-ENTMCNC: 31.3 G/DL — LOW (ref 32–37)
MCHC RBC-ENTMCNC: 31.5 G/DL — LOW (ref 32–37)
MCV RBC AUTO: 74.8 FL — LOW (ref 81–99)
MCV RBC AUTO: 75.5 FL — LOW (ref 81–99)
NITRITE UR-MCNC: NEGATIVE — SIGNIFICANT CHANGE UP
NRBC # BLD: 0 /100 WBCS — SIGNIFICANT CHANGE UP (ref 0–0)
NRBC # BLD: 0 /100 WBCS — SIGNIFICANT CHANGE UP (ref 0–0)
PH UR: 7 — SIGNIFICANT CHANGE UP (ref 5–8)
PHOSPHATE SERPL-MCNC: 3.7 MG/DL — SIGNIFICANT CHANGE UP (ref 2.1–4.9)
PLATELET # BLD AUTO: 249 K/UL — SIGNIFICANT CHANGE UP (ref 130–400)
PLATELET # BLD AUTO: 258 K/UL — SIGNIFICANT CHANGE UP (ref 130–400)
POTASSIUM SERPL-MCNC: 4 MMOL/L — SIGNIFICANT CHANGE UP (ref 3.5–5)
POTASSIUM SERPL-SCNC: 4 MMOL/L — SIGNIFICANT CHANGE UP (ref 3.5–5)
PROT SERPL-MCNC: 7.6 G/DL — SIGNIFICANT CHANGE UP (ref 6–8)
PROT UR-MCNC: NEGATIVE — SIGNIFICANT CHANGE UP
RBC # BLD: 3.07 M/UL — LOW (ref 4.2–5.4)
RBC # BLD: 3.18 M/UL — LOW (ref 4.2–5.4)
RBC # BLD: 3.26 M/UL — LOW (ref 4.2–5.4)
RBC # FLD: 15.7 % — HIGH (ref 11.5–14.5)
RBC # FLD: 15.8 % — HIGH (ref 11.5–14.5)
RETICS #: 13.5 K/UL — LOW (ref 25–125)
RETICS/RBC NFR: 0.4 % — LOW (ref 0.5–1.5)
SODIUM SERPL-SCNC: 137 MMOL/L — SIGNIFICANT CHANGE UP (ref 135–146)
SP GR SPEC: 1.01 — SIGNIFICANT CHANGE UP (ref 1.01–1.02)
TRANSFERRIN SERPL-MCNC: 167 MG/DL — LOW (ref 200–360)
UROBILINOGEN FLD QL: SIGNIFICANT CHANGE UP
WBC # BLD: 4.32 K/UL — LOW (ref 4.8–10.8)
WBC # BLD: 4.64 K/UL — LOW (ref 4.8–10.8)
WBC # FLD AUTO: 4.32 K/UL — LOW (ref 4.8–10.8)
WBC # FLD AUTO: 4.64 K/UL — LOW (ref 4.8–10.8)

## 2019-10-29 PROCEDURE — 99233 SBSQ HOSP IP/OBS HIGH 50: CPT

## 2019-10-29 PROCEDURE — 73120 X-RAY EXAM OF HAND: CPT | Mod: 26,50

## 2019-10-29 PROCEDURE — 83020 HEMOGLOBIN ELECTROPHORESIS: CPT | Mod: 26

## 2019-10-29 RX ORDER — MECLIZINE HCL 12.5 MG
25 TABLET ORAL EVERY 8 HOURS
Refills: 0 | Status: DISCONTINUED | OUTPATIENT
Start: 2019-10-29 | End: 2019-10-31

## 2019-10-29 RX ORDER — LOPERAMIDE HCL 2 MG
2 TABLET ORAL ONCE
Refills: 0 | Status: COMPLETED | OUTPATIENT
Start: 2019-10-29 | End: 2019-10-29

## 2019-10-29 RX ORDER — ENOXAPARIN SODIUM 100 MG/ML
40 INJECTION SUBCUTANEOUS AT BEDTIME
Refills: 0 | Status: DISCONTINUED | OUTPATIENT
Start: 2019-10-29 | End: 2019-10-31

## 2019-10-29 RX ORDER — SODIUM CHLORIDE 9 MG/ML
1000 INJECTION INTRAMUSCULAR; INTRAVENOUS; SUBCUTANEOUS
Refills: 0 | Status: DISCONTINUED | OUTPATIENT
Start: 2019-10-29 | End: 2019-10-31

## 2019-10-29 RX ADMIN — ENOXAPARIN SODIUM 40 MILLIGRAM(S): 100 INJECTION SUBCUTANEOUS at 22:21

## 2019-10-29 RX ADMIN — Medication 50 MILLIGRAM(S): at 22:21

## 2019-10-29 RX ADMIN — Medication 0.6 MILLIGRAM(S): at 05:38

## 2019-10-29 RX ADMIN — Medication 50 MILLIGRAM(S): at 13:57

## 2019-10-29 RX ADMIN — PANTOPRAZOLE SODIUM 40 MILLIGRAM(S): 20 TABLET, DELAYED RELEASE ORAL at 05:38

## 2019-10-29 RX ADMIN — Medication 50 MILLIGRAM(S): at 05:38

## 2019-10-29 RX ADMIN — Medication 0.6 MILLIGRAM(S): at 18:43

## 2019-10-29 RX ADMIN — Medication 50 MILLIGRAM(S): at 06:57

## 2019-10-29 RX ADMIN — Medication 2 MILLIGRAM(S): at 22:34

## 2019-10-29 RX ADMIN — Medication 50 MILLIGRAM(S): at 13:31

## 2019-10-29 RX ADMIN — SODIUM CHLORIDE 100 MILLILITER(S): 9 INJECTION INTRAMUSCULAR; INTRAVENOUS; SUBCUTANEOUS at 13:35

## 2019-10-29 NOTE — CONSULT NOTE ADULT - ATTENDING COMMENTS
Microcytic anemia, likely at least partially related to iron deficiency, patient reports gluten intolerance, she denies heavy menses.   Check iron studies, B12, folate, electrophoresis panel.   Heme follow up as outpatient is advised.  Consider restarting on PPX Lovenox, no evidence of over bleeding at this time.
Seen / examined and above reviewed.    Cardiac history of recurrent pericarditis.  First episode occurred in setting of apparent viral illness.    Admitted with pleuritic chest pain consistent with pericarditis.  Pain improved since admission.    EKG: NSR  Hemodynamics stable.  No JVP.  No clinical tamponade.  Ruled-out MI.    Effusion on CT.  ECHO just completed.    Patient treated with steroids after index episode of pericarditis.  Brief courses of NSAIDS since then.  Never treated with Colchicine.    RECOMMEND:  - Cont Indomethacin and Colchicine.  - Will review ECHO.  - Check ESR and autoimmune labs.
23 yo F with hx clinically suspected gluten sensitivity and pericarditis admitted with recurrent pericarditis. Recurrent symptoms for < 2 years, initial presentation suspected to be post viral. Treated mostly with NSAIDs, also had one episode treated with steroids. She doesn't typically seek medical attention when symptoms develop as her symptoms typically resolve relatively quickly with home NSAID use. Does report history of clinically suspected gluten sensitivity with development of diarrhea, LE rash associated with gluten exposure. No prior testing for this - serologies pending. Workup thus far has revealed microcytic anemia in the absence of heavy menses, high titer RF, elevated inflammatory markers. Denies F/C/unintentional weight loss, alopecia, sicca symptoms. oral/nasal/vaginal ulcers, lymphadenopathy, SOB, arthralgias, AM stiffness, Raynaud's, VTE, miscarriages. Endorses minimal GERD, non-bloody diarrhea, intermittent proximal digit swelling of hands. Adopted - unsure re: FHx. No chronic medications used, not using OCPs. Going to school and working.     Recurrent pericarditis  Recurrent diarrhea/rash attributed to food trigger  Mildly enlarge bilateral mediastinal/axillary LN on CTA chest, unremarkable CXR  High titer RF  Elevated inflammatory markers    Autoimmune etiologies for recurrent pericarditis may include CTD/inflammatory arthritis/vasculitis/sarcoidosis/IBD although she doesn't display many other extra cardiac symptoms beyond recurrent diarrhea which she attributes to gluten sensitivity. MYA currently pending - check UA, will f/u MYA with ab subsets if positive, recommend checking CCP/hand and foot xrays. ? fullness at R 2nd/3rd MCP, pt noted very slight tenderness at R 2nd MCP. In the setting of high titer RF would screen HBV/HCV. In the setting of microcytic anemia and recurrent diarrhea check stool guiac please. If guiac positive would consult GI. Celiac serologies pending. Avoid senna use - if diarrhea persists may decrease frequency of colchicine. Agree with colchicine and NSAID use for treatment of pericarditis. Advised food with NSAID dosing. Avoid steroids if able. Will follow.

## 2019-10-29 NOTE — PROGRESS NOTE ADULT - ASSESSMENT
25 yo female with PMHx of pericarditis, presents for unresolving chest pain associated with difficulty breathing.    # Pleuritic chest pain and dyspnea due to recurrent pericarditis unclear etiology possible viral etiology  with pericardial effusion  - chest pain is typical for pericarditis. EKG showing sinus tachycardia   - c/w telemonitoring  - comfortable on room air. Saturating at 97%. NC to maintain SPO2>95% PRN.   - trops negative x 3  - TTE report noted - normal EF with small pericardial effusion. On repeat echo EF changed from 72 to 60 and pericardial effusion changed from small to small to moderate. Will continue to monitor   - c/w indomethacin and colchicine at current doses with protonix. Will monitor renal function and LFT"s with colchicine    - c/w percocet prn for pain as above meds were not helping chest pain much    - Cardio follow up  - ESR elevated - 105. Will repeat ESR and CRP  - Awaiting result of rheumatology workup MYA, RF, CRP and celiac serologies    # Acute on possible chronic microcytic anemia unclear etiology (possible differential could be GIB with PUD with use of NSAIDS vs chronic rheumatological disease like lupus)   - Hb on admission 8.6. Now 7.5  - will consider transfusion if drops further  - monitor CBC q12hr   - GI consult if  Hb continues to drop persistently as patient is on NSAIDS and consider switching to prednisone with colchicine if Hb persistently dropping.  - send guaiac test once has BM and monitor for GI bleed  - c/w Protonix 40mg QD   - keep 2 large bore peripheral IV accesses.     # mildly enlarged mediastinal and b/l axillary lymph nodes on CT chest likely inflammatory   - follow up CT chest after resolution of pericarditis as OP to confirm resolution    # constipation  - senna/miralax daily prn       Disposition: home when stable.

## 2019-10-29 NOTE — PROGRESS NOTE ADULT - ATTENDING COMMENTS
I saw and examined the patient independently. I agree with above history, physical exam and plan of care which I have reviewed and edited where appropriate with following additions.    patient reports doing little better       recurrent pericarditis unclear etiology possibilities include viral vs inflammatory dz:    c/w telemonitor/ tachycardia   c/w current indocin and colchicine doses with protonix 40mg daily.   repeat TTE today as patient was dyspneic today with sinus tachycardia with pleuritic chest pain.   talked to patient - avoid percocet due to possibility of contributing to dizziness - patient not dependent on narcotics.   try tylenol prn as addition to above meds for pain.     acute on chronic microcytic anemia likely due to underlying thalassemia( patient friend at bedside reported today she has thalassemia).   repeat H/H today - pending/discussed with resident to obtain labs today  avoid further transfusion if Hb around 8- likely patient's baseline.      dizziness possibly symptomatic anemia     reported diarrhea with LE rash:   no further diarrhea reported  fu celiac serology given anemia.     Progress note Handoff:   Pending: clinical symptom's improvement / repeat TTE/ H/H today   Discussion: patient/  satisfied- all questions answered.  Disposition: patient and friend at bedside . I saw and examined the patient independently. I agree with above history, physical exam and plan of care which I have reviewed and edited where appropriate with following additions.    patient reports doing little better, pleuritic Cp little better today, dizziness better after starting meclizine.        recurrent pericarditis with pericardial effusion unclear etiology possibilities include viral vs inflammatory dz:    c/w telemonitor/sinus  tachycardia   c/w current indocin(until CP resolves with trend down of CRP and then taper down as OP) and colchicine doses(6months for recurrence) with protonix 40mg daily.   repeat TTE shows small- mod pericardial effusion - stable / no evidence of cardiac tamponade   Cardio fu appreciated - agree with rheumatology consult to find out etiology of pericarditis given elevated inflammatory markers and hematology consult for anemia.   fu rheumatology consult for recommendations.    acute on chronic microcytic symptomatic anemia unclear etiology possibly chronic dz with h/o underlying thalassemia trait vs chr. inflammatory dz vs  ?celiac dz:  dizziness/tachycardia   H/H gradually dropping again sp 1 unit PRBC's. no prior baseline ,presented with 8.6.   check repeat H/H today afternoon if Hb <7, transfuse 1 unit.   send stool for guiac test , if positive- GI consult to r/o PUD vs celiac dz.   c/w protonix.   fu celiac serology  fu hem/onc recommendations.     dizziness possibly symptomatic anemia vs positional vertigo:   patient reports meclizine is helping her dizziness.   c/w meclizine   transfuse prn based on h/h     reported diarrhea with LE rash:   no further diarrhea reported  fu celiac serology given anemia.   GI consult if Hb persistently dropping with +ve guiac.     Progress note Handoff:   Pending: clinical symptom's improvement/H/H stabilization/ rheumatological perales /hematological blood perales.   Discussion: patient and boyfriend/  satisfied- all questions answered.  Disposition: home when stable

## 2019-10-29 NOTE — CONSULT NOTE ADULT - SUBJECTIVE AND OBJECTIVE BOX
Patient is a 24y old  Female who presents with a chief complaint of Chest pain/dyspnea (29 Oct 2019 10:08)        HPI:  23 yo female  PMHx: ?pericarditis  CC: Chest pain, dyspnea  History dates back to this AM when pt started having worsening chest pain. Pt started having chest pain last Thursday which got better and then worse this AM. Chest pain is constant, present on both sides, sharp, radiating to the back of the neck. Pain is aggravated by deep inspiration, lying down and movement. Pain is partially relieved by leaning forward and sitting down. Pain is associated with difficulty breathing and nausea. Pt also endorses having intermittent palpitations. Pt endorses symptoms of flu/upper respiratory tract infection 1 week ago. Pt felt feverish but not documented with chills. Pt also had episode of diarrhea with nausea 2 days ago. Pt has history of similar previous episodes. 1st episode was in december 2017 post viral syndrome, and pt was diagnosed in January 2018 with pericarditis at a [place] hospital, and she received Ibuprofen with prednisone for a few days. Since then pt has had similar episodes of lesser intensity. Last hospitalization in August 2018 for chest pain treated with ibuprofen and prednisone 2/2 pericarditis.   Pt denies abdominal pain, recent travel, night sweats, vision changes, headaches. Denies cough, dizziness, trauma to the chest/fall.   ER Course: Vitals were /84, , T 97F, SpO2 99% on RA. EKG showed sinus tachycardia. D-Dimer elevated. CT angio ruled out PE, but showed moderate pericardial effusion. Bedside US in showed moderate pericardial effusion, no R AV collapse or evidence of tamponade. (25 Oct 2019 17:19)           PAST MEDICAL & SURGICAL HISTORY:  Pericarditis  No significant past surgical history        FAMILY HISTORY:  Family history not known due to adoption      Social History:    Allergies    No Known Allergies    Intolerances        MEDICATIONS  (STANDING):  chlorhexidine 4% Liquid 1 Application(s) Topical <User Schedule>  colchicine 0.6 milliGRAM(s) Oral two times a day  indomethacin 50 milliGRAM(s) Oral three times a day  influenza   Vaccine 0.5 milliLiter(s) IntraMuscular once  pantoprazole    Tablet 40 milliGRAM(s) Oral before breakfast  sodium chloride 0.9%. 1000 milliLiter(s) (100 mL/Hr) IV Continuous <Continuous>    MEDICATIONS  (PRN):  diphenhydrAMINE 50 milliGRAM(s) Oral every 4 hours PRN Rash and/or Itching  meclizine 25 milliGRAM(s) Oral every 8 hours PRN Dizziness  melatonin 5 milliGRAM(s) Oral at bedtime PRN Insomnia  oxycodone    5 mG/acetaminophen 325 mG 1 Tablet(s) Oral every 6 hours PRN Moderate Pain (4 - 6)      Vital Signs Last 24 Hrs  T(C): 35.8 (29 Oct 2019 05:46), Max: 36.9 (28 Oct 2019 20:59)  T(F): 96.5 (29 Oct 2019 05:46), Max: 98.4 (28 Oct 2019 20:59)  HR: 89 (29 Oct 2019 05:46) (89 - 89)  BP: --  BP(mean): --  RR: 18 (29 Oct 2019 05:46) (18 - 18)  SpO2: --    ROS:  Negative except for:    PHYSICAL EXAM  General: adult in NAD  HEENT: clear oropharynx, anicteric sclera, pink conjunctiva  Neck: supple  CV: normal S1/S2 with no murmur rubs or gallops  Lungs: positive air movement b/l ant lungs,clear to auscultation, no wheezes, no rales  Abdomen: soft non-tender non-distended, no hepatosplenomegaly  Ext: no clubbing cyanosis or edema  Skin: no rashes and no petechiae  Neuro: alert and oriented X 4, no focal deficits      LABS:                          7.5    4.32  )-----------( 258      ( 29 Oct 2019 05:36 )             23.8         Mean Cell Volume : 74.8 fL  Mean Cell Hemoglobin : 23.6 pg  Mean Cell Hemoglobin Concentration : 31.5 g/dL  Auto Neutrophil # : x  Auto Lymphocyte # : x  Auto Monocyte # : x  Auto Eosinophil # : x  Auto Basophil # : x  Auto Neutrophil % : x  Auto Lymphocyte % : x  Auto Monocyte % : x  Auto Eosinophil % : x  Auto Basophil % : x      Serial CBC's  10-29 @ 05:36  Hct-23.8 / Hgb-7.5 / Plat-258 / RBC-3.18 / WBC-4.32  Serial CBC's  10-28 @ 20:55  Hct-25.7 / Hgb-8.1 / Plat-258 / RBC-3.43 / WBC-4.89  Serial CBC's  10-27 @ 05:59  Hct-26.3 / Hgb-8.2 / Plat-241 / RBC-3.46 / WBC-4.68  Serial CBC's  10-27 @ 01:12  Hct-24.8 / Hgb-7.7 / Plat-204 / RBC-3.24 / WBC-3.74  Serial CBC's  10-26 @ 22:00  Hct-26.8 / Hgb-8.3 / Plat-230 / RBC-3.49 / WBC-4.16  Serial CBC's  10-26 @ 11:40  Hct-22.4 / Hgb-6.9 / Plat-209 / RBC-3.00 / WBC-5.56  Serial CBC's  10-26 @ 05:59  Hct-23.0 / Hgb-7.1 / Plat-211 / RBC-3.09 / WBC-4.63  Serial CBC's  10-25 @ 12:12  Hct-27.8 / Hgb-8.6 / Plat-263 / RBC-3.74 / WBC-7.95      10-29    137  |  103  |  11  ----------------------------<  92  4.0   |  23  |  0.7    Ca    8.5      29 Oct 2019 05:36  Phos  3.7     10-29  Mg     1.9     10-29    TPro  7.6  /  Alb  2.7<L>  /  TBili  0.2  /  DBili  x   /  AST  17  /  ALT  12  /  AlkPhos  65  10-29                    LIVER FUNCTIONS - ( 29 Oct 2019 05:36 )  Alb: 2.7 g/dL / Pro: 7.6 g/dL / ALK PHOS: 65 U/L / ALT: 12 U/L / AST: 17 U/L / GGT: x           BLOOD SMEAR INTERPRETATION:       RADIOLOGY & ADDITIONAL STUDIES:    Assessment And Plan: Patient is a 24y old  Female who presents with a chief complaint of Chest pain/dyspnea (29 Oct 2019 10:08)        HPI:  23 yo female with history of recurrent pericarditis presented to ED in 10/25 with worsening chest pain.   CC: Chest pain, dyspnea. Chest pain was constant, present on both sides, sharp, radiating to the back of the neck. Pain is aggravated by deep inspiration, lying down and movement. Pain is partially relieved by leaning forward and sitting down. Pain is associated with difficulty breathing and nausea. Pt also endorses having intermittent palpitations. Pt endorses symptoms of flu/upper respiratory tract infection 1 week ago. Pt felt feverish but not documented with chills. Pt also had episode of diarrhea with nausea 2 days ago. Pt has history of similar previous episodes. 1st episode was in december 2017 post viral syndrome, and pt was diagnosed in January 2018 with pericarditis at a [facility], and she received Ibuprofen with prednisone for a few days. Since then pt has had similar episodes of lesser intensity. Last hospitalization in August 2018 for chest pain treated with ibuprofen and prednisone 2/2 pericarditis.   Pt denies abdominal pain, recent travel, night sweats, vision changes, headaches. Denies cough, dizziness, trauma to the chest/fall.   Currently on indomethacin and Colchicine. Echo here shows small to moderate pericardial effussion.   She was also told she has thalessemia trait. But not sure if she had HB electrophoresis.   She is noted to have microcytic anemia and hematology is consulted for further work up.              PAST MEDICAL & SURGICAL HISTORY:  Pericarditis  No significant past surgical history        FAMILY HISTORY:  Family history not known due to adoption      Social History:    Allergies    No Known Allergies    Intolerances        MEDICATIONS  (STANDING):  chlorhexidine 4% Liquid 1 Application(s) Topical <User Schedule>  colchicine 0.6 milliGRAM(s) Oral two times a day  indomethacin 50 milliGRAM(s) Oral three times a day  influenza   Vaccine 0.5 milliLiter(s) IntraMuscular once  pantoprazole    Tablet 40 milliGRAM(s) Oral before breakfast  sodium chloride 0.9%. 1000 milliLiter(s) (100 mL/Hr) IV Continuous <Continuous>    MEDICATIONS  (PRN):  diphenhydrAMINE 50 milliGRAM(s) Oral every 4 hours PRN Rash and/or Itching  meclizine 25 milliGRAM(s) Oral every 8 hours PRN Dizziness  melatonin 5 milliGRAM(s) Oral at bedtime PRN Insomnia  oxycodone    5 mG/acetaminophen 325 mG 1 Tablet(s) Oral every 6 hours PRN Moderate Pain (4 - 6)      Vital Signs Last 24 Hrs  T(C): 35.8 (29 Oct 2019 05:46), Max: 36.9 (28 Oct 2019 20:59)  T(F): 96.5 (29 Oct 2019 05:46), Max: 98.4 (28 Oct 2019 20:59)  HR: 89 (29 Oct 2019 05:46) (89 - 89)  BP: --  BP(mean): --  RR: 18 (29 Oct 2019 05:46) (18 - 18)  SpO2: --    ROS:  Negative except for:    PHYSICAL EXAM  General: adult in NAD  HEENT: clear oropharynx, anicteric sclera, pink conjunctiva  Neck: supple  CV: normal S1/S2 with no murmur rubs or gallops  Lungs: positive air movement b/l ant lungs,clear to auscultation, no wheezes, no rales  Abdomen: soft non-tender non-distended, no hepatosplenomegaly  Ext: no clubbing cyanosis or edema  Skin: no rashes and no petechiae  Neuro: alert and oriented X 4, no focal deficits      LABS:                          7.5    4.32  )-----------( 258      ( 29 Oct 2019 05:36 )             23.8         Mean Cell Volume : 74.8 fL  Mean Cell Hemoglobin : 23.6 pg  Mean Cell Hemoglobin Concentration : 31.5 g/dL  Auto Neutrophil # : x  Auto Lymphocyte # : x  Auto Monocyte # : x  Auto Eosinophil # : x  Auto Basophil # : x  Auto Neutrophil % : x  Auto Lymphocyte % : x  Auto Monocyte % : x  Auto Eosinophil % : x  Auto Basophil % : x      Serial CBC's  10-29 @ 05:36  Hct-23.8 / Hgb-7.5 / Plat-258 / RBC-3.18 / WBC-4.32  Serial CBC's  10-28 @ 20:55  Hct-25.7 / Hgb-8.1 / Plat-258 / RBC-3.43 / WBC-4.89  Serial CBC's  10-27 @ 05:59  Hct-26.3 / Hgb-8.2 / Plat-241 / RBC-3.46 / WBC-4.68  Serial CBC's  10-27 @ 01:12  Hct-24.8 / Hgb-7.7 / Plat-204 / RBC-3.24 / WBC-3.74  Serial CBC's  10-26 @ 22:00  Hct-26.8 / Hgb-8.3 / Plat-230 / RBC-3.49 / WBC-4.16  Serial CBC's  10-26 @ 11:40  Hct-22.4 / Hgb-6.9 / Plat-209 / RBC-3.00 / WBC-5.56  Serial CBC's  10-26 @ 05:59  Hct-23.0 / Hgb-7.1 / Plat-211 / RBC-3.09 / WBC-4.63  Serial CBC's  10-25 @ 12:12  Hct-27.8 / Hgb-8.6 / Plat-263 / RBC-3.74 / WBC-7.95      10-29    137  |  103  |  11  ----------------------------<  92  4.0   |  23  |  0.7    Ca    8.5      29 Oct 2019 05:36  Phos  3.7     10-29  Mg     1.9     10-29    TPro  7.6  /  Alb  2.7<L>  /  TBili  0.2  /  DBili  x   /  AST  17  /  ALT  12  /  AlkPhos  65  10-29                    LIVER FUNCTIONS - ( 29 Oct 2019 05:36 )  Alb: 2.7 g/dL / Pro: 7.6 g/dL / ALK PHOS: 65 U/L / ALT: 12 U/L / AST: 17 U/L / GGT: x           BLOOD SMEAR INTERPRETATION:       RADIOLOGY & ADDITIONAL STUDIES:    Assessment And Plan:

## 2019-10-29 NOTE — CONSULT NOTE ADULT - ASSESSMENT
23 yo F with PMH of pericarditis presented to ED complaining of worsening chest pain.    Assessment & Plan:  Pericarditis, recurrent    ATTENDING NOTE TO FOLLOW 23 yo F with PMH of pericarditis presented to ED complaining of worsening chest pain.    Assessment & Plan:  Pericarditis, recurrent  Elevated RF    - check anti-CCP + Hep B/C screening  - also check b/l hand XR's  - hold Senna in light of diarrhea  - may need further workup depending on MYA results  - will follow    Discussed case with attending.

## 2019-10-29 NOTE — PROGRESS NOTE ADULT - SUBJECTIVE AND OBJECTIVE BOX
SUBJECTIVE:    Patient is a 24y old Female who presents with a chief complaint of Chest pain/dyspnea    Currently admitted to medicine with the primary diagnosis of Pericarditis     Today is hospital day 4d. This morning she is resting comfortably in bed and reports no new issues or overnight events. She still reports having episodes of chest pain     PAST MEDICAL & SURGICAL HISTORY  Pericarditis  No significant past surgical history    ALLERGIES:  No Known Allergies    MEDICATIONS:  STANDING MEDICATIONS  chlorhexidine 4% Liquid 1 Application(s) Topical <User Schedule>  colchicine 0.6 milliGRAM(s) Oral two times a day  indomethacin 50 milliGRAM(s) Oral three times a day  influenza   Vaccine 0.5 milliLiter(s) IntraMuscular once  pantoprazole    Tablet 40 milliGRAM(s) Oral before breakfast  sodium chloride 0.9%. 1000 milliLiter(s) IV Continuous <Continuous>    PRN MEDICATIONS  diphenhydrAMINE 50 milliGRAM(s) Oral every 4 hours PRN  melatonin 5 milliGRAM(s) Oral at bedtime PRN  oxycodone    5 mG/acetaminophen 325 mG 1 Tablet(s) Oral every 6 hours PRN    VITALS:   T(F): 96.5  HR: 89  BP: --  RR: 18  SpO2: --    LABS:                        7.5    4.32  )-----------( 258      ( 29 Oct 2019 05:36 )             23.8     10-29    137  |  103  |  11  ----------------------------<  92  4.0   |  23  |  0.7    Ca    8.5      29 Oct 2019 05:36  Phos  3.7     10-29  Mg     1.9     10-29    TPro  7.6  /  Alb  2.7<L>  /  TBili  0.2  /  DBili  x   /  AST  17  /  ALT  12  /  AlkPhos  65  10-29      RADIOLOGY:  < from: Transthoracic Echocardiogram (10.28.19 @ 12:49) >  Summary:   1. LV Ejection Fraction by Alcaraz's Method with a biplane EF of 60 %.   2. Normal left ventricular size and wall thicknesses, with normal   systolic and diastolic function.   3. Small to moderate sized pericardial effusion.   4. LA volume Index is 25.0 ml/m² ml/m2.    < end of copied text >      PHYSICAL EXAM:  GEN: Episodes of chest pain causing acute distress   LUNGS: Clear to auscultation bilaterally   HEART: S1/S2 present. RRR.   ABD: Soft  EXT: Moves all extremities. No gross motor deficit   NEURO: AAOX3

## 2019-10-29 NOTE — CONSULT NOTE ADULT - SUBJECTIVE AND OBJECTIVE BOX
SPECIAL ETHAN  [id number]  Female  24y  HPI:  25 yo female  PMHx: ?pericarditis  CC: Chest pain, dyspnea  History dates back to this AM when pt started having worsening chest pain. Pt started having chest pain last Thursday which got better and then worse this AM. Chest pain is constant, present on both sides, sharp, radiating to the back of the neck. Pain is aggravated by deep inspiration, lying down and movement. Pain is partially relieved by leaning forward and sitting down. Pain is associated with difficulty breathing and nausea. Pt also endorses having intermittent palpitations. Pt endorses symptoms of flu/upper respiratory tract infection 1 week ago. Pt felt feverish but not documented with chills. Pt also had episode of diarrhea with nausea 2 days ago. Pt has history of similar previous episodes. 1st episode was in december 2017 post viral syndrome, and pt was diagnosed in January 2018 with pericarditis at a [place] hospital, and she received Ibuprofen with prednisone for a few days. Since then pt has had similar episodes of lesser intensity. Last hospitalization in August 2018 for chest pain treated with ibuprofen and prednisone 2/2 pericarditis.   Pt denies abdominal pain, recent travel, night sweats, vision changes, headaches. Denies cough, dizziness, trauma to the chest/fall.   ER Course: Vitals were /84, , T 97F, SpO2 99% on RA. EKG showed sinus tachycardia. D-Dimer elevated. CT angio ruled out PE, but showed moderate pericardial effusion. Bedside US in showed moderate pericardial effusion, no R AV collapse or evidence of tamponade. (25 Oct 2019 17:19)    Rheumatology is consulted for recurrent pericarditis. Patient states she has had approximately 4-5 attacks since Jan 2018. Only went to hospital first time (in [place]). Never went again, self-resolved. Patient endorses some swelling in b/l hands, states it is worse when it is cold. Also endorses some neck and lower back pain, ongoing for 1-2 weeks. Denies any fatigue, visual problems, joint pain, SOB, LE edema, fevers, rash, or other complaints. Was adopted. Unsure of any family history of autoimmune diseases. Takes no meds at home. Never seen Rheumatologist.    PAST MEDICAL & SURGICAL HISTORY:  Pericarditis  No significant past surgical history    FAMILY HISTORY:  Family history not known due to adoption    MEDICATIONS  (STANDING):  chlorhexidine 4% Liquid 1 Application(s) Topical <User Schedule>  colchicine 0.6 milliGRAM(s) Oral two times a day  indomethacin 50 milliGRAM(s) Oral three times a day  influenza   Vaccine 0.5 milliLiter(s) IntraMuscular once  pantoprazole    Tablet 40 milliGRAM(s) Oral before breakfast  sodium chloride 0.9%. 1000 milliLiter(s) (100 mL/Hr) IV Continuous <Continuous>    MEDICATIONS  (PRN):  diphenhydrAMINE 50 milliGRAM(s) Oral every 4 hours PRN Rash and/or Itching  melatonin 5 milliGRAM(s) Oral at bedtime PRN Insomnia  oxycodone    5 mG/acetaminophen 325 mG 1 Tablet(s) Oral every 6 hours PRN Moderate Pain (4 - 6)    Allergies    No Known Allergies    REVIEW OF SYSTEMS:  CONSTITUTIONAL: No weakness, fevers or chills  EYES/ENT: No visual changes;  No vertigo or throat pain   NECK: No pain or stiffness  RESPIRATORY: No cough, wheezing, hemoptysis; No shortness of breath  CARDIOVASCULAR: see HPI  GASTROINTESTINAL: No abdominal or epigastric pain. No nausea, vomiting, or hematemesis; No diarrhea or constipation. No melena or hematochezia.  GENITOURINARY: No dysuria, frequency or hematuria  NEUROLOGICAL: No numbness or weakness  SKIN: No itching, rashes    Allergic/Immunologic:	    GEN: NAD, comfortable  CARDIO: RRR, no m/r/g  RESP: CTAB, no w/r/r  ABD: soft, NT/ND, +BS  EXT: no edema, pp b/l  NEURO: AAOx3, grossly normal    CBC Full  -  ( 29 Oct 2019 05:36 )  WBC Count : 4.32 K/uL  RBC Count : 3.18 M/uL  Hemoglobin : 7.5 g/dL  Hematocrit : 23.8 %  Platelet Count - Automated : 258 K/uL  Mean Cell Volume : 74.8 fL  Mean Cell Hemoglobin : 23.6 pg  Mean Cell Hemoglobin Concentration : 31.5 g/dL    LIVER FUNCTIONS - ( 29 Oct 2019 05:36 )  Alb: 2.7 g/dL / Pro: 7.6 g/dL / ALK PHOS: 65 U/L / ALT: 12 U/L / AST: 17 U/L / GGT: x           10-29    137  |  103  |  11  ----------------------------<  92  4.0   |  23  |  0.7    Ca    8.5      29 Oct 2019 05:36  Phos  3.7     10-29  Mg     1.9     10-29    TPro  7.6  /  Alb  2.7<L>  /  TBili  0.2  /  DBili  x   /  AST  17  /  ALT  12  /  AlkPhos  65  10-29    Sedimentation Rate, Erythrocyte: 128 mm/Hr (10.25.19 @ 21:55)  C-Reactive Protein, Serum: 11.29 mg/dL (10.25.19 @ 21:55)  Rheumatoid Factor Quant, Serum or Plasma: 91 IU/mL (10.26.19 @ 22:00)    < from: Transthoracic Echocardiogram (10.28.19 @ 12:49) >  Summary:   1. LV Ejection Fraction by Alcaraz's Method with a biplane EF of 60 %.   2. Normal left ventricular size and wall thicknesses, with normal systolic and diastolic function.   3. Small to moderate sized pericardial effusion.   4. LA volume Index is 25.0 ml/m² ml/m2.

## 2019-10-29 NOTE — CONSULT NOTE ADULT - ASSESSMENT
In summary, patient is 24 year old female with history of recurrent episodes of preicarditis treated with NSAIDS now admitted for chest pain secondary to pericarditis.   Lab work is significant for microcytic anemia with normal wbc and platelets.   ESR, CRP and RF are high suggestive of inflammatory process.   Menses are regular lasting 4-5 days. denies clots. No other sites of bleeding.   With Gluten she get hives    1. Microcytic anemia: Could be multifactorial. Iron deficiency, anemia of chronic diesease and thalassemia trait.   Get retic count, haptoglobin, LDH, iron studies, b12 and folate level.   Hb electrophoresis for ? Thalassemia trait.     Further recommendations based on above work up.

## 2019-10-30 LAB
ANA PAT FLD IF-IMP: ABNORMAL
ANA PATTERN 2: ABNORMAL
ANA TITR SER: ABNORMAL
ANION GAP SERPL CALC-SCNC: 10 MMOL/L — SIGNIFICANT CHANGE UP (ref 7–14)
ANTI NUCLEAR FACTOR TITER 2: ABNORMAL
BUN SERPL-MCNC: 9 MG/DL — LOW (ref 10–20)
CALCIUM SERPL-MCNC: 8.6 MG/DL — SIGNIFICANT CHANGE UP (ref 8.5–10.1)
CCP IGG SERPL-ACNC: <8 UNITS — SIGNIFICANT CHANGE UP (ref 0–19)
CHLORIDE SERPL-SCNC: 104 MMOL/L — SIGNIFICANT CHANGE UP (ref 98–110)
CO2 SERPL-SCNC: 22 MMOL/L — SIGNIFICANT CHANGE UP (ref 17–32)
CREAT SERPL-MCNC: 0.7 MG/DL — SIGNIFICANT CHANGE UP (ref 0.7–1.5)
ERYTHROCYTE [SEDIMENTATION RATE] IN BLOOD: 19 MM/HR — SIGNIFICANT CHANGE UP (ref 0–20)
FERRITIN SERPL-MCNC: 149 NG/ML — SIGNIFICANT CHANGE UP (ref 15–150)
FOLATE SERPL-MCNC: 3.9 NG/ML — LOW
GLUCOSE SERPL-MCNC: 91 MG/DL — SIGNIFICANT CHANGE UP (ref 70–99)
HAPTOGLOB SERPL-MCNC: 210 MG/DL — HIGH (ref 34–200)
HAV IGM SER-ACNC: SIGNIFICANT CHANGE UP
HBV CORE IGM SER-ACNC: SIGNIFICANT CHANGE UP
HBV SURFACE AG SER-ACNC: SIGNIFICANT CHANGE UP
HCT VFR BLD CALC: 22.1 % — LOW (ref 37–47)
HCT VFR BLD CALC: 24 % — LOW (ref 37–47)
HCV AB S/CO SERPL IA: 0.16 S/CO — SIGNIFICANT CHANGE UP (ref 0–0.99)
HCV AB SERPL-IMP: SIGNIFICANT CHANGE UP
HGB BLD-MCNC: 6.9 G/DL — CRITICAL LOW (ref 12–16)
HGB BLD-MCNC: 7.7 G/DL — LOW (ref 12–16)
IRON SATN MFR SERPL: 16 UG/DL — LOW (ref 35–150)
IRON SATN MFR SERPL: 8 % — LOW (ref 15–50)
MAGNESIUM SERPL-MCNC: 1.8 MG/DL — SIGNIFICANT CHANGE UP (ref 1.8–2.4)
MCHC RBC-ENTMCNC: 23.2 PG — LOW (ref 27–31)
MCHC RBC-ENTMCNC: 23.9 PG — LOW (ref 27–31)
MCHC RBC-ENTMCNC: 31.2 G/DL — LOW (ref 32–37)
MCHC RBC-ENTMCNC: 32.1 G/DL — SIGNIFICANT CHANGE UP (ref 32–37)
MCV RBC AUTO: 74.4 FL — LOW (ref 81–99)
MCV RBC AUTO: 74.5 FL — LOW (ref 81–99)
NRBC # BLD: 0 /100 WBCS — SIGNIFICANT CHANGE UP (ref 0–0)
NRBC # BLD: 0 /100 WBCS — SIGNIFICANT CHANGE UP (ref 0–0)
PLATELET # BLD AUTO: 214 K/UL — SIGNIFICANT CHANGE UP (ref 130–400)
PLATELET # BLD AUTO: 246 K/UL — SIGNIFICANT CHANGE UP (ref 130–400)
POTASSIUM SERPL-MCNC: 4.4 MMOL/L — SIGNIFICANT CHANGE UP (ref 3.5–5)
POTASSIUM SERPL-SCNC: 4.4 MMOL/L — SIGNIFICANT CHANGE UP (ref 3.5–5)
RBC # BLD: 2.97 M/UL — LOW (ref 4.2–5.4)
RBC # BLD: 3.22 M/UL — LOW (ref 4.2–5.4)
RBC # FLD: 15.8 % — HIGH (ref 11.5–14.5)
RBC # FLD: 15.8 % — HIGH (ref 11.5–14.5)
RF+CCP IGG SER-IMP: NEGATIVE — SIGNIFICANT CHANGE UP
SODIUM SERPL-SCNC: 136 MMOL/L — SIGNIFICANT CHANGE UP (ref 135–146)
TIBC SERPL-MCNC: 202 UG/DL — LOW (ref 220–430)
UIBC SERPL-MCNC: 186 UG/DL — SIGNIFICANT CHANGE UP (ref 110–370)
VIT B12 SERPL-MCNC: 482 PG/ML — SIGNIFICANT CHANGE UP (ref 232–1245)
WBC # BLD: 4.8 K/UL — SIGNIFICANT CHANGE UP (ref 4.8–10.8)
WBC # BLD: 6.3 K/UL — SIGNIFICANT CHANGE UP (ref 4.8–10.8)
WBC # FLD AUTO: 4.8 K/UL — SIGNIFICANT CHANGE UP (ref 4.8–10.8)
WBC # FLD AUTO: 6.3 K/UL — SIGNIFICANT CHANGE UP (ref 4.8–10.8)

## 2019-10-30 PROCEDURE — 99233 SBSQ HOSP IP/OBS HIGH 50: CPT

## 2019-10-30 RX ORDER — ACETAMINOPHEN 500 MG
650 TABLET ORAL EVERY 6 HOURS
Refills: 0 | Status: DISCONTINUED | OUTPATIENT
Start: 2019-10-30 | End: 2019-10-31

## 2019-10-30 RX ADMIN — Medication 0.6 MILLIGRAM(S): at 06:06

## 2019-10-30 RX ADMIN — Medication 50 MILLIGRAM(S): at 06:06

## 2019-10-30 RX ADMIN — ENOXAPARIN SODIUM 40 MILLIGRAM(S): 100 INJECTION SUBCUTANEOUS at 21:42

## 2019-10-30 RX ADMIN — Medication 50 MILLIGRAM(S): at 12:34

## 2019-10-30 RX ADMIN — Medication 0.6 MILLIGRAM(S): at 17:07

## 2019-10-30 RX ADMIN — Medication 650 MILLIGRAM(S): at 07:25

## 2019-10-30 RX ADMIN — Medication 50 MILLIGRAM(S): at 00:00

## 2019-10-30 RX ADMIN — Medication 50 MILLIGRAM(S): at 07:25

## 2019-10-30 RX ADMIN — Medication 650 MILLIGRAM(S): at 23:04

## 2019-10-30 RX ADMIN — Medication 650 MILLIGRAM(S): at 06:06

## 2019-10-30 RX ADMIN — Medication 650 MILLIGRAM(S): at 23:30

## 2019-10-30 RX ADMIN — Medication 50 MILLIGRAM(S): at 21:42

## 2019-10-30 RX ADMIN — Medication 650 MILLIGRAM(S): at 12:34

## 2019-10-30 RX ADMIN — Medication 50 MILLIGRAM(S): at 22:10

## 2019-10-30 RX ADMIN — PANTOPRAZOLE SODIUM 40 MILLIGRAM(S): 20 TABLET, DELAYED RELEASE ORAL at 06:06

## 2019-10-30 NOTE — PROGRESS NOTE ADULT - ASSESSMENT
23 yo female with PMHx of pericarditis, presents for unresolving chest pain associated with difficulty breathing    # Pleuritic chest pain and dyspnea due to recurrent pericarditis unclear etiology possible viral etiology  with pericardial effusion  - chest pain is typical for pericarditis. EKG showing sinus tachycardia   - will consider discontinuing tele since no events since admission   - comfortable on room air. Saturating at 97%. NC to maintain SPO2>95% PRN.   - trops negative x 3  - TTE report noted - normal EF with small pericardial effusion. On repeat echo EF changed from 72 to 60 and pericardial effusion changed from small to small to moderate. Will continue to monitor  - c/w indomethacin and colchicine at current doses with protonix. Will monitor renal function and LFT"s with colchicine    - c/w percocet prn for pain as above meds were not helping chest pain much    - Cardio follow up  - ESR down to 19 from 105  - Awaiting results from rheumatology workup.           # Acute on possible chronic microcytic anemia unclear etiology (possible differential could be GIB with PUD with use of NSAIDS vs chronic rheumatological disease like lupus)   - Hb on admission 8.6. Now 7.5  - will consider transfusion if drops further  - monitor CBC q12hr   - GI consult if  Hb continues to drop persistently as patient is on NSAIDS and consider switching to prednisone with colchicine if Hb persistently dropping.  - send guaiac test once has BM and monitor for GI bleed  - c/w Protonix 40mg QD   - keep 2 large bore peripheral IV accesses.  - iron studies sent. low iron 15 noted. likely iron deficiency anemia      # mildly enlarged mediastinal and b/l axillary lymph nodes on CT chest likely inflammatory   - follow up CT chest after resolution of pericarditis as OP to confirm resolution    # constipation  - senna/miralax daily prn       Disposition: home when stable. 23 yo female with PMHx of pericarditis, presents for unresolving chest pain associated with difficulty breathing    # Pleuritic chest pain and dyspnea due to recurrent pericarditis unclear etiology possible viral etiology  with pericardial effusion  - chest pain is typical for pericarditis. EKG showing sinus tachycardia   - will consider discontinuing tele since no events since admission   - comfortable on room air. Saturating at 97%. NC to maintain SPO2>95% PRN.   - trops negative x 3  - TTE report noted - normal EF with small pericardial effusion. On repeat echo EF changed from 72 to 60 and pericardial effusion changed from small to small to moderate. Will continue to monitor  - c/w indomethacin and colchicine at current doses with protonix. Will monitor renal function and LFT"s with colchicine    - c/w percocet prn for pain as above meds were not helping chest pain much    - Cardio follow up  - ESR down to 19 from 105  - Awaiting results from rheumatology workup.       # Acute on possible chronic microcytic anemia unclear etiology (possible differential could be GIB with PUD with use of NSAIDS vs chronic rheumatological disease like lupus)   - Hb now 6.9. will repeat CBC. If drops further will transfuse   - monitor CBC q12hr   - GI consult if  Hb continues to drop persistently as patient is on NSAIDS and consider switching to prednisone with colchicine if Hb persistently dropping.  - send guaiac test once has BM and monitor for GI bleed  - c/w Protonix 40mg QD   - keep 2 large bore peripheral IV accesses.  - iron studies sent. low iron 15 noted. likely iron deficiency anemia      # mildly enlarged mediastinal and b/l axillary lymph nodes on CT chest likely inflammatory   - follow up CT chest after resolution of pericarditis as OP to confirm resolution    # constipation  - senna/miralax daily prn       Disposition: home when stable. 25 yo female with PMHx of pericarditis, presents for unresolving chest pain associated with difficulty breathing    # Pleuritic chest pain and dyspnea due to recurrent pericarditis unclear etiology possible viral etiology  with pericardial effusion  - chest pain is typical for pericarditis. EKG showing sinus tachycardia   - will consider discontinuing tele since no events since admission   - comfortable on room air. Saturating at 97%. NC to maintain SPO2>95% PRN.   - trops negative x 3  - TTE report noted - normal EF with small pericardial effusion. On repeat echo EF changed from 72 to 60 and pericardial effusion changed from small to small to moderate. Will continue to monitor  - c/w indomethacin and colchicine at current doses with protonix. Will monitor renal function and LFT"s with colchicine    - c/w percocet prn for pain as above meds were not helping chest pain much    - Cardio follow up  - ESR down to 19 from 105  - Awaiting results from rheumatology workup.       # Acute on possible chronic microcytic anemia unclear etiology (possible differential could be GIB with PUD with use of NSAIDS vs chronic rheumatological disease like lupus)   - Hb now 6.9. will repeat CBC. If drops further will transfuse   - monitor CBC q12hr   - GI consult if  Hb continues to drop persistently as patient is on NSAIDS and consider switching to prednisone with colchicine if Hb persistently dropping.  - send guaiac test once has BM and monitor for GI bleed  - c/w Protonix 40mg QD   - keep 2 large bore peripheral IV accesses.  - iron studies sent. low iron 15 noted. likely iron deficiency anemia      # mildly enlarged mediastinal and b/l axillary lymph nodes on CT chest likely inflammatory   - follow up CT chest after resolution of pericarditis as OP to confirm resolution    # constipation  - senna/miralax daily prn       Disposition: home when stable.    Attending Attestation    Pt has been seen and examined. Case and Plan discussed and agree with above.  Follow up all anemia workup. Monitor H/H. Need to r/o GIB  So far no evidence of hemolysis.  Pericarditis   Iron Deficiency Anemia  Possible Celiac Disease   f/u Inflamatory Markers  f/u Hg Electrophoresis   NSAIDs for Pericarditis   Possible GI Consult pending w/up results     Dispo: Possible d/c in the morning

## 2019-10-30 NOTE — PROGRESS NOTE ADULT - SUBJECTIVE AND OBJECTIVE BOX
SUBJECTIVE:    Patient is a 24y old Female who presents with a chief complaint of Chest pain/dyspnea    Currently admitted to medicine with the primary diagnosis of Pericarditis     Today is hospital day 5d. This morning she is resting comfortably in bed and reports no new issues or overnight events.     PAST MEDICAL & SURGICAL HISTORY  Pericarditis  No significant past surgical history      ALLERGIES:  No Known Allergies    MEDICATIONS:  STANDING MEDICATIONS  chlorhexidine 4% Liquid 1 Application(s) Topical <User Schedule>  colchicine 0.6 milliGRAM(s) Oral two times a day  enoxaparin Injectable 40 milliGRAM(s) SubCutaneous at bedtime  indomethacin 50 milliGRAM(s) Oral three times a day  influenza   Vaccine 0.5 milliLiter(s) IntraMuscular once  pantoprazole    Tablet 40 milliGRAM(s) Oral before breakfast  sodium chloride 0.9%. 1000 milliLiter(s) IV Continuous <Continuous>    PRN MEDICATIONS  acetaminophen   Tablet .. 650 milliGRAM(s) Oral every 6 hours PRN  diphenhydrAMINE 50 milliGRAM(s) Oral every 4 hours PRN  meclizine 25 milliGRAM(s) Oral every 8 hours PRN  melatonin 5 milliGRAM(s) Oral at bedtime PRN    VITALS:   T(F): 99.3  HR: --  BP: --  RR: --  SpO2: --    LABS:                        6.9    6.30  )-----------( 214      ( 30 Oct 2019 05:33 )             22.1     10-    136  |  104  |  9<L>  ----------------------------<  91  4.4   |  22  |  0.7    Ca    8.6      30 Oct 2019 05:33  Phos  3.7     10  Mg     1.8     10-30    TPro  7.6  /  Alb  2.7<L>  /  TBili  0.2  /  DBili  x   /  AST  17  /  ALT  12  /  AlkPhos  65  10-29      Urinalysis Basic - ( 29 Oct 2019 16:19 )    Color: Light Yellow / Appearance: Clear / S.013 / pH: x  Gluc: x / Ketone: Negative  / Bili: Negative / Urobili: <2 mg/dL   Blood: x / Protein: Negative / Nitrite: Negative   Leuk Esterase: Negative / RBC: x / WBC x   Sq Epi: x / Non Sq Epi: x / Bacteria: x        Sedimentation Rate, Erythrocyte: 19 mm/Hr (10-30- @ 05:33)      PHYSICAL EXAM:  GEN: Episodes of chest pain causing acute distress   LUNGS: Clear to auscultation bilaterally   HEART: S1/S2 present. RRR.   ABD: Soft  EXT: Moves all extremities. No gross motor deficit   NEURO: AAOX3 SUBJECTIVE:    Patient is a 24y old Female who presents with a chief complaint of Chest pain/dyspnea    Currently admitted to medicine with the primary diagnosis of Pericarditis     Today is hospital day 5d. This morning she is resting comfortably in bed and reports no new issues or overnight events.     PAST MEDICAL & SURGICAL HISTORY  Pericarditis  No significant past surgical history      ALLERGIES:  No Known Allergies    MEDICATIONS:  STANDING MEDICATIONS  chlorhexidine 4% Liquid 1 Application(s) Topical <User Schedule>  colchicine 0.6 milliGRAM(s) Oral two times a day  enoxaparin Injectable 40 milliGRAM(s) SubCutaneous at bedtime  indomethacin 50 milliGRAM(s) Oral three times a day  influenza   Vaccine 0.5 milliLiter(s) IntraMuscular once  pantoprazole    Tablet 40 milliGRAM(s) Oral before breakfast  sodium chloride 0.9%. 1000 milliLiter(s) IV Continuous <Continuous>    PRN MEDICATIONS  acetaminophen   Tablet .. 650 milliGRAM(s) Oral every 6 hours PRN  diphenhydrAMINE 50 milliGRAM(s) Oral every 4 hours PRN  meclizine 25 milliGRAM(s) Oral every 8 hours PRN  melatonin 5 milliGRAM(s) Oral at bedtime PRN    VITALS:   T(F): 99.3  HR: --  BP: --  RR: --  SpO2: --    LABS:                        6.9    6.30  )-----------( 214      ( 30 Oct 2019 05:33 )             22.1     10-    136  |  104  |  9<L>  ----------------------------<  91  4.4   |  22  |  0.7    Ca    8.6      30 Oct 2019 05:33  Phos  3.7     10  Mg     1.8     10-30    TPro  7.6  /  Alb  2.7<L>  /  TBili  0.2  /  DBili  x   /  AST  17  /  ALT  12  /  AlkPhos  65  10-29      Urinalysis Basic - ( 29 Oct 2019 16:19 )    Color: Light Yellow / Appearance: Clear / S.013 / pH: x  Gluc: x / Ketone: Negative  / Bili: Negative / Urobili: <2 mg/dL   Blood: x / Protein: Negative / Nitrite: Negative   Leuk Esterase: Negative / RBC: x / WBC x   Sq Epi: x / Non Sq Epi: x / Bacteria: x    Sedimentation Rate, Erythrocyte: 19 mm/Hr (10-30- @ 05:33)    PHYSICAL EXAM:  GEN: Episodes of chest pain causing acute distress   LUNGS: Clear to auscultation bilaterally   HEART: S1/S2 present. RRR.   ABD: Soft  EXT: Moves all extremities. No gross motor deficit   NEURO: AAOX3

## 2019-10-31 ENCOUNTER — TRANSCRIPTION ENCOUNTER (OUTPATIENT)
Age: 24
End: 2019-10-31

## 2019-10-31 VITALS
TEMPERATURE: 98 F | SYSTOLIC BLOOD PRESSURE: 116 MMHG | RESPIRATION RATE: 18 BRPM | DIASTOLIC BLOOD PRESSURE: 75 MMHG | HEART RATE: 114 BPM

## 2019-10-31 DIAGNOSIS — R76.8 OTHER SPECIFIED ABNORMAL IMMUNOLOGICAL FINDINGS IN SERUM: ICD-10-CM

## 2019-10-31 LAB
ANION GAP SERPL CALC-SCNC: 9 MMOL/L — SIGNIFICANT CHANGE UP (ref 7–14)
ANTI ENDOMYSIAL IGA IFA: NEGATIVE — SIGNIFICANT CHANGE UP
ANTI HUMAN TISSUE TRANSGLUTAMINASE IGA ELISA: < 1.9 — SIGNIFICANT CHANGE UP
BLD GP AB SCN SERPL QL: SIGNIFICANT CHANGE UP
BUN SERPL-MCNC: 13 MG/DL — SIGNIFICANT CHANGE UP (ref 10–20)
CALCIUM SERPL-MCNC: 8.4 MG/DL — LOW (ref 8.5–10.1)
CCP IGG SERPL-ACNC: <8 UNITS — SIGNIFICANT CHANGE UP (ref 0–19)
CHLORIDE SERPL-SCNC: 106 MMOL/L — SIGNIFICANT CHANGE UP (ref 98–110)
CO2 SERPL-SCNC: 23 MMOL/L — SIGNIFICANT CHANGE UP (ref 17–32)
CREAT SERPL-MCNC: 0.6 MG/DL — LOW (ref 0.7–1.5)
CRP SERPL-MCNC: 7.12 MG/DL — HIGH (ref 0–0.4)
CULTURE RESULTS: SIGNIFICANT CHANGE UP
DEAMIDATED GLIADIN PEPTIDE ANTIBODY IGA: < 5.2 — SIGNIFICANT CHANGE UP
DEAMIDATED GLIADIN PEPTIDE ANTIBODY IGG: 14.4 — SIGNIFICANT CHANGE UP
GLUCOSE SERPL-MCNC: 108 MG/DL — HIGH (ref 70–99)
HCT VFR BLD CALC: 23.4 % — LOW (ref 37–47)
HEMOGLOBIN INTERPRETATION: SIGNIFICANT CHANGE UP
HGB A MFR BLD: 97.5 % — SIGNIFICANT CHANGE UP (ref 95.8–98)
HGB A2 MFR BLD: 2.5 % — SIGNIFICANT CHANGE UP (ref 2–3.2)
HGB BLD-MCNC: 7.3 G/DL — LOW (ref 12–16)
MAGNESIUM SERPL-MCNC: 1.9 MG/DL — SIGNIFICANT CHANGE UP (ref 1.8–2.4)
MCHC RBC-ENTMCNC: 23.2 PG — LOW (ref 27–31)
MCHC RBC-ENTMCNC: 31.2 G/DL — LOW (ref 32–37)
MCV RBC AUTO: 74.5 FL — LOW (ref 81–99)
NRBC # BLD: 0 /100 WBCS — SIGNIFICANT CHANGE UP (ref 0–0)
PLATELET # BLD AUTO: 250 K/UL — SIGNIFICANT CHANGE UP (ref 130–400)
POTASSIUM SERPL-MCNC: 4.2 MMOL/L — SIGNIFICANT CHANGE UP (ref 3.5–5)
POTASSIUM SERPL-SCNC: 4.2 MMOL/L — SIGNIFICANT CHANGE UP (ref 3.5–5)
PROMETHEUS CELIAC SEROLOGY: SIGNIFICANT CHANGE UP
PROMETHEUS LABORATORY FOOTER: SIGNIFICANT CHANGE UP
RBC # BLD: 3.14 M/UL — LOW (ref 4.2–5.4)
RBC # FLD: 15.9 % — HIGH (ref 11.5–14.5)
RF+CCP IGG SER-IMP: NEGATIVE — SIGNIFICANT CHANGE UP
SODIUM SERPL-SCNC: 138 MMOL/L — SIGNIFICANT CHANGE UP (ref 135–146)
SPECIMEN SOURCE: SIGNIFICANT CHANGE UP
TOTAL SERUM IGA BY NEPHELOMETRY: 678 MG/DL — SIGNIFICANT CHANGE UP
WBC # BLD: 5.05 K/UL — SIGNIFICANT CHANGE UP (ref 4.8–10.8)
WBC # FLD AUTO: 5.05 K/UL — SIGNIFICANT CHANGE UP (ref 4.8–10.8)

## 2019-10-31 PROCEDURE — 99238 HOSP IP/OBS DSCHRG MGMT 30/<: CPT

## 2019-10-31 RX ORDER — FOLIC ACID 0.8 MG
1 TABLET ORAL
Qty: 30 | Refills: 0
Start: 2019-10-31 | End: 2019-11-29

## 2019-10-31 RX ORDER — COLCHICINE 0.6 MG
1 TABLET ORAL
Qty: 28 | Refills: 0
Start: 2019-10-31 | End: 2019-11-13

## 2019-10-31 RX ORDER — FOLIC ACID 0.8 MG
1 TABLET ORAL
Qty: 20 | Refills: 0
Start: 2019-10-31 | End: 2019-11-19

## 2019-10-31 RX ORDER — INDOMETHACIN 50 MG
1 CAPSULE ORAL
Qty: 28 | Refills: 0
Start: 2019-10-31 | End: 2019-11-13

## 2019-10-31 RX ORDER — IRON SUCROSE 20 MG/ML
200 INJECTION, SOLUTION INTRAVENOUS ONCE
Refills: 0 | Status: COMPLETED | OUTPATIENT
Start: 2019-10-31 | End: 2019-10-31

## 2019-10-31 RX ORDER — PANTOPRAZOLE SODIUM 20 MG/1
1 TABLET, DELAYED RELEASE ORAL
Qty: 14 | Refills: 0
Start: 2019-10-31 | End: 2019-11-13

## 2019-10-31 RX ORDER — MECLIZINE HCL 12.5 MG
1 TABLET ORAL
Qty: 7 | Refills: 0
Start: 2019-10-31 | End: 2019-11-06

## 2019-10-31 RX ORDER — IBUPROFEN 200 MG
1 TABLET ORAL
Qty: 0 | Refills: 0 | DISCHARGE

## 2019-10-31 RX ADMIN — Medication 50 MILLIGRAM(S): at 05:28

## 2019-10-31 RX ADMIN — IRON SUCROSE 110 MILLIGRAM(S): 20 INJECTION, SOLUTION INTRAVENOUS at 11:54

## 2019-10-31 RX ADMIN — PANTOPRAZOLE SODIUM 40 MILLIGRAM(S): 20 TABLET, DELAYED RELEASE ORAL at 06:46

## 2019-10-31 RX ADMIN — Medication 50 MILLIGRAM(S): at 14:43

## 2019-10-31 RX ADMIN — Medication 50 MILLIGRAM(S): at 14:13

## 2019-10-31 RX ADMIN — Medication 0.6 MILLIGRAM(S): at 05:28

## 2019-10-31 NOTE — PROGRESS NOTE ADULT - SUBJECTIVE AND OBJECTIVE BOX
Patient feels well. chest pain better.     Vital Signs Last 24 Hrs  T(C): 36.6 (31 Oct 2019 05:08), Max: 36.7 (30 Oct 2019 20:25)  T(F): 97.9 (31 Oct 2019 05:08), Max: 98.1 (30 Oct 2019 20:25)  HR: 101 (31 Oct 2019 05:08) (101 - 101)  BP: 109/71 (31 Oct 2019 05:08) (104/67 - 109/71)  BP(mean): --  RR: 18 (31 Oct 2019 05:08) (18 - 18)  SpO2: --    MEDICATIONS  (STANDING):  chlorhexidine 4% Liquid 1 Application(s) Topical <User Schedule>  colchicine 0.6 milliGRAM(s) Oral two times a day  enoxaparin Injectable 40 milliGRAM(s) SubCutaneous at bedtime  indomethacin 50 milliGRAM(s) Oral three times a day  influenza   Vaccine 0.5 milliLiter(s) IntraMuscular once  iron sucrose IVPB 200 milliGRAM(s) IV Intermittent once  pantoprazole    Tablet 40 milliGRAM(s) Oral before breakfast  sodium chloride 0.9%. 1000 milliLiter(s) (100 mL/Hr) IV Continuous <Continuous>    MEDICATIONS  (PRN):  acetaminophen   Tablet .. 650 milliGRAM(s) Oral every 6 hours PRN Temp greater or equal to 38C (100.4F), Mild Pain (1 - 3)  diphenhydrAMINE 50 milliGRAM(s) Oral every 4 hours PRN Rash and/or Itching  meclizine 25 milliGRAM(s) Oral every 8 hours PRN Dizziness  melatonin 5 milliGRAM(s) Oral at bedtime PRN Insomnia                          7.3    5.05  )-----------( 250      ( 31 Oct 2019 07:07 )             23.4   Iron with Total Binding Capacity (10.30.19 @ 11:54)    Iron - Total Binding Capacity.: 202 ug/dL    % Saturation, Iron: 8 %    Iron Total, Serum: 16 ug/dL    Unsaturated Iron Binding Capacity: 186 ug/dL

## 2019-10-31 NOTE — DISCHARGE NOTE PROVIDER - CARE PROVIDER_API CALL
Steve Johnson)  Cardiovascular Disease; Internal Medicine  [address] [place] [address]  Phone: [phone]  Fax: [phone]  Follow Up Time: 2 weeks Steve Johnson)  Cardiovascular Disease; Internal Medicine  [address] [place] [address]  Phone: [phone]  Fax: [phone]  Follow Up Time: 2 weeks    Yanna Giron)  HematologyOncology; Internal Medicine; Medical Oncology  [address] [place] [address]  Phone: [phone]  Fax: [phone]  Follow Up Time:     Ana Morales (DO)  Internal Medicine  [address] [place] [address]  Phone: [phone]  Fax: [phone]  Follow Up Time: Steve Johnson (MD)  Cardiovascular Disease; Internal Medicine  [address] [place] [address]  Phone: [phone]  Fax: [phone]  Follow Up Time: 2 weeks    Yanna Giron)  HematologyOncology; Internal Medicine; Medical Oncology  [address] [place] [address]  Phone: [phone]  Fax: [phone]  Follow Up Time:     Ana Morales (DO)  Internal Medicine  [address] [place] [address]  Phone: [phone]  Fax: [phone]  Follow Up Time:     Alex Coyne (DO)  Medicine  Physicians  [address] [place] [address]  Phone: [phone]  Fax: [phone]  Follow Up Time: 2 weeks

## 2019-10-31 NOTE — DISCHARGE NOTE NURSING/CASE MANAGEMENT/SOCIAL WORK - PATIENT PORTAL LINK FT
You can access the FollowMyHealth Patient Portal offered by [facility] by registering at the following website: [email or web address]. By joining [facility]’s FollowMyHealth portal, you will also be able to view your health information using other applications (apps) compatible with our system.
no

## 2019-10-31 NOTE — PROGRESS NOTE ADULT - ATTENDING COMMENTS
25 yo F with hx clinically suspected gluten sensitivity and pericarditis admitted with recurrent pericarditis.     Recurrent pericarditis  Recurrent diarrhea/rash attributed to food trigger  Mildly enlarge bilateral mediastinal/axillary LN on CTA chest, unremarkable CXR  High titer RF, MYA  Elevated inflammatory markers    Recommend ab subsets, complements for MYA workup. CCP noted to be negative. Unremarkable hand xrays. Should still complete stool hemoccult given microcytic anemia in the setting of recurrent pericarditis (if stool pos for blood would refer to GI for IBD r/o). Will schedule f/u with me in 3-4 weeks in the outpatient setting.

## 2019-10-31 NOTE — PROGRESS NOTE ADULT - ASSESSMENT
Microcytic anemia: Multifactorial.   Iron studies reviewed has iron deiciency anemia. Her menses are not heavy.   Recommend GI eval to r/o bleeding.   send celiac disease panel as well ( H/o gluten intolerance)  Give IV venofer one dose today before discharge and she can follow up with Dr. Giron at [facility] ( [phone])  start folic acid po. Homocysteine and MMA are pending  Awaiting HB electrophoresis. Microcytic anemia: Multifactorial.   Iron studies reviewed has iron deiciency anemia. Her menses are not heavy.   Recommend GI eval to r/o bleeding.   send celiac disease panel as well ( H/o gluten intolerance)  Give IV venofer one dose today before discharge and she can follow up with Dr. Giron at [facility] ( [phone])  start folic acid po. Homocysteine and MMA orders are cancelled. Please send them before giving folic acid  Awaiting HB electrophoresis.

## 2019-10-31 NOTE — PROGRESS NOTE ADULT - SUBJECTIVE AND OBJECTIVE BOX
SUBJECTIVE:    Stable, no new complaints.    PAST MEDICAL & SURGICAL HISTORY  Pericarditis  No significant past surgical history    SOCIAL HISTORY:  Negative for smoking/alcohol/drug use.     ALLERGIES:  No Known Allergies    MEDICATIONS:  STANDING MEDICATIONS  chlorhexidine 4% Liquid 1 Application(s) Topical <User Schedule>  colchicine 0.6 milliGRAM(s) Oral two times a day  enoxaparin Injectable 40 milliGRAM(s) SubCutaneous at bedtime  indomethacin 50 milliGRAM(s) Oral three times a day  influenza   Vaccine 0.5 milliLiter(s) IntraMuscular once  pantoprazole    Tablet 40 milliGRAM(s) Oral before breakfast  sodium chloride 0.9%. 1000 milliLiter(s) IV Continuous <Continuous>    PRN MEDICATIONS  acetaminophen   Tablet .. 650 milliGRAM(s) Oral every 6 hours PRN  diphenhydrAMINE 50 milliGRAM(s) Oral every 4 hours PRN  meclizine 25 milliGRAM(s) Oral every 8 hours PRN  melatonin 5 milliGRAM(s) Oral at bedtime PRN    VITALS:   T(F): 97.6  HR: 114  BP: 116/75  RR: 18  SpO2: --    LABS:                        7.3    5.05  )-----------( 250      ( 31 Oct 2019 07:07 )             23.4     10    138  |  106  |  13  ----------------------------<  108<H>  4.2   |  23  |  0.6<L>    Ca    8.4<L>      31 Oct 2019 07:07  Mg     1.9     10-31    Urinalysis Basic - ( 29 Oct 2019 16:19 )    Color: Light Yellow / Appearance: Clear / S.013 / pH: x  Gluc: x / Ketone: Negative  / Bili: Negative / Urobili: <2 mg/dL   Blood: x / Protein: Negative / Nitrite: Negative   Leuk Esterase: Negative / RBC: x / WBC x   Sq Epi: x / Non Sq Epi: x / Bacteria: x    PHYSICAL EXAM:  GEN: NAD, comfortable  LUNGS: CTAB, no w/r/r  HEART: RRR, no m/r/g  ABD: soft, NT/ND, +BS  EXT: no edema, PP b/l  NEURO: AAOx3 SUBJECTIVE:    Reports doing well. No more chest pain. Diarrhea has improved. Planning for discharge later today.    PAST MEDICAL & SURGICAL HISTORY  Pericarditis  No significant past surgical history    SOCIAL HISTORY:  Negative for smoking/alcohol/drug use.     ALLERGIES:  No Known Allergies    MEDICATIONS:  STANDING MEDICATIONS  chlorhexidine 4% Liquid 1 Application(s) Topical <User Schedule>  colchicine 0.6 milliGRAM(s) Oral two times a day  enoxaparin Injectable 40 milliGRAM(s) SubCutaneous at bedtime  indomethacin 50 milliGRAM(s) Oral three times a day  influenza   Vaccine 0.5 milliLiter(s) IntraMuscular once  pantoprazole    Tablet 40 milliGRAM(s) Oral before breakfast  sodium chloride 0.9%. 1000 milliLiter(s) IV Continuous <Continuous>    PRN MEDICATIONS  acetaminophen   Tablet .. 650 milliGRAM(s) Oral every 6 hours PRN  diphenhydrAMINE 50 milliGRAM(s) Oral every 4 hours PRN  meclizine 25 milliGRAM(s) Oral every 8 hours PRN  melatonin 5 milliGRAM(s) Oral at bedtime PRN    VITALS:   T(F): 97.6  HR: 114  BP: 116/75  RR: 18  SpO2: --    LABS:                        7.3    5.05  )-----------( 250      ( 31 Oct 2019 07:07 )             23.4     10    138  |  106  |  13  ----------------------------<  108<H>  4.2   |  23  |  0.6<L>    Ca    8.4<L>      31 Oct 2019 07:07  Mg     1.9     10-31    Urinalysis Basic - ( 29 Oct 2019 16:19 )    Color: Light Yellow / Appearance: Clear / S.013 / pH: x  Gluc: x / Ketone: Negative  / Bili: Negative / Urobili: <2 mg/dL   Blood: x / Protein: Negative / Nitrite: Negative   Leuk Esterase: Negative / RBC: x / WBC x   Sq Epi: x / Non Sq Epi: x / Bacteria: x SUBJECTIVE:    Reports doing well. No more chest pain. Diarrhea has improved. Planning for discharge later today.    PAST MEDICAL & SURGICAL HISTORY  Pericarditis  No significant past surgical history    SOCIAL HISTORY:  Negative for smoking/alcohol/drug use.     ALLERGIES:  No Known Allergies    MEDICATIONS:  STANDING MEDICATIONS  chlorhexidine 4% Liquid 1 Application(s) Topical <User Schedule>  colchicine 0.6 milliGRAM(s) Oral two times a day  enoxaparin Injectable 40 milliGRAM(s) SubCutaneous at bedtime  indomethacin 50 milliGRAM(s) Oral three times a day  influenza   Vaccine 0.5 milliLiter(s) IntraMuscular once  pantoprazole    Tablet 40 milliGRAM(s) Oral before breakfast  sodium chloride 0.9%. 1000 milliLiter(s) IV Continuous <Continuous>    PRN MEDICATIONS  acetaminophen   Tablet .. 650 milliGRAM(s) Oral every 6 hours PRN  diphenhydrAMINE 50 milliGRAM(s) Oral every 4 hours PRN  meclizine 25 milliGRAM(s) Oral every 8 hours PRN  melatonin 5 milliGRAM(s) Oral at bedtime PRN    VITALS:   T(F): 97.6  HR: 114  BP: 116/75  RR: 18  SpO2: --  PE:  Gen: NAD, well appearing  HEENT: MMM  CV: regular rhythm, tachycardic, no rub appreciated  Pulm: CTA bilaterally, no conversational dyspnea  MSK: No active synovitis or joint effusions, thickened R 2nd and 3rd MCPs without significant tenderness, normal passive ROM bilateral wrists, elbows, shoulders, ankles  Skin: Rash resolved  Neuro: Normal muscle bulk/tone    LABS:                        7.3    5.05  )-----------( 250      ( 31 Oct 2019 07:07 )             23.4     10-31    138  |  106  |  13  ----------------------------<  108<H>  4.2   |  23  |  0.6<L>    Ca    8.4<L>      31 Oct 2019 07:07  Mg     1.9     10-31    Urinalysis Basic - ( 29 Oct 2019 16:19 )    Color: Light Yellow / Appearance: Clear / S.013 / pH: x  Gluc: x / Ketone: Negative  / Bili: Negative / Urobili: <2 mg/dL   Blood: x / Protein: Negative / Nitrite: Negative   Leuk Esterase: Negative / RBC: x / WBC x   Sq Epi: x / Non Sq Epi: x / Bacteria: x

## 2019-10-31 NOTE — PROGRESS NOTE ADULT - ASSESSMENT
23 yo F with PMH of pericarditis presented to ED complaining of worsening chest pain.    Assessment & Plan:  Pericarditis, recurrent  Elevated RF + MYA titers    ATTENDING NOTE TO FOLLOW

## 2019-10-31 NOTE — PROGRESS NOTE ADULT - REASON FOR ADMISSION
Chest Pain
Chest pain/dyspnea

## 2019-10-31 NOTE — DISCHARGE NOTE PROVIDER - PROVIDER TOKENS
PROVIDER:[TOKEN:[[id number]],FOLLOWUP:[2 weeks]] PROVIDER:[TOKEN:[[id number]],FOLLOWUP:[2 weeks]],PROVIDER:[TOKEN:[[id number]]],PROVIDER:[TOKEN:[[id number]]] PROVIDER:[TOKEN:[[id number]],FOLLOWUP:[2 weeks]],PROVIDER:[TOKEN:[[id number]]],PROVIDER:[TOKEN:[[id number]]],PROVIDER:[TOKEN:[[id number]],FOLLOWUP:[2 weeks]]

## 2019-10-31 NOTE — DISCHARGE NOTE PROVIDER - NSDCFUADDAPPT_GEN_ALL_CORE_FT
Please follow-up with your new primary care provider at [address] [place] [address] on November 14, 2019 at 8:30 AM.

## 2019-10-31 NOTE — DISCHARGE NOTE PROVIDER - NSDCMRMEDTOKEN_GEN_ALL_CORE_FT
Colcrys 0.6 mg oral tablet: 1 tab(s) orally 2 times a day  folic acid 1 mg oral tablet: 1 tab(s) orally once a day   indomethacin 25 mg oral capsule: 1 cap(s) orally 2 times a day   meclizine 25 mg oral tablet: 1 tab(s) orally once a day, As Needed -Dizziness - for dizziness   pantoprazole 40 mg oral delayed release tablet: 1 tab(s) orally once a day (before a meal) Colcrys 0.6 mg oral tablet: 1 tab(s) orally 2 times a day  folic acid 1 mg oral tablet: 1 tab(s) orally once a day   folic acid 1 mg oral tablet: 1 tab(s) orally once a day   indomethacin 25 mg oral capsule: 1 cap(s) orally 2 times a day   meclizine 25 mg oral tablet: 1 tab(s) orally once a day, As Needed -Dizziness - for dizziness   pantoprazole 40 mg oral delayed release tablet: 1 tab(s) orally once a day (before a meal)

## 2019-10-31 NOTE — DISCHARGE NOTE PROVIDER - HOSPITAL COURSE
A 24-year-old female with a prior history of pericarditis presented with chest pain for about one week.  Pain is aggravated by deep inspiration, lying down and movement. Pain is partially relieved by leaning forward and sitting down. Pain is associated with difficulty breathing and nausea.  In the ED her EKG showed sinus tachycardia. D-Dimer elevated. CT angio ruled out PE, but showed moderate pericardial effusion.  The patient was evaluated by cardiology and was started on Colchicine and NSAIDs.  A 2D echo revealed normal LV EF and a small-to-moderate sized pericardial effusion.  In addition she was evaluated by rheumatology due to the suspicion of autoimmune etiology of her symptoms; autoimmune blood-work was sent.  She was evaluated by hematology for her microcytic anemia.  She was found to be iron deficient and was ordered to received IV iron today.  Her symptoms have improved since admission and she is planned to be discharged home with instructions to continue Colchicine and taper NSAIDs as well as close cardiology follow-up. A 24-year-old female with a prior history of pericarditis presented with chest pain for about one week.  Pain is aggravated by deep inspiration, lying down and movement. Pain is partially relieved by leaning forward and sitting down. Pain is associated with difficulty breathing and nausea.  In the ED her EKG showed sinus tachycardia. D-Dimer elevated. CT angio ruled out PE, but showed moderate pericardial effusion.  The patient was evaluated by cardiology and was started on Colchicine and NSAIDs.  A 2D echo revealed normal LV EF and a small-to-moderate sized pericardial effusion.  In addition she was evaluated by rheumatology due to the suspicion of autoimmune etiology of her symptoms; autoimmune blood-work was sent.  She was evaluated by hematology for her microcytic anemia.  She was found to be iron deficient and was ordered to received IV iron today.  Her symptoms have improved since admission and she is planned to be discharged home with instructions to continue Colchicine and taper NSAIDs as well as close cardiology follow-up.        Overall pt is aware she needs to f/u with hematology, possible GI eval in office to r/o Celiac Dz and also f/u with PCP - Referred to Dr. ROMERO in [facility]                                 7.3      5.05  )-----------( 250      ( 31 Oct 2019 07:07 )               23.4         10-31        138  |  106  |  13    ----------------------------<  108<H>    4.2   |  23  |  0.6<L>        Ca    8.4<L>      31 Oct 2019 07:07    Mg     1.9     10-31            Vital Signs Last 24 Hrs    T(C): 36.4 (31 Oct 2019 13:03), Max: 36.7 (30 Oct 2019 20:25)    T(F): 97.6 (31 Oct 2019 13:03), Max: 98.1 (30 Oct 2019 20:25)    HR: 114 (31 Oct 2019 13:03) (101 - 114)    BP: 116/75 (31 Oct 2019 13:03) (104/67 - 116/75)    RR: 18 (31 Oct 2019 13:03) (18 - 18)        CV: NL S1, S2    Resp: CTA Bilateral     GI: +BS, Soft, NT, ND     Ext: No e/c/c     MS: AOx3         Meds per medrec    Colchicine 0.6mg po bid     Indometacin 25mg po bid     Protonix 20mg po qd    FA 1mg po qd    Ferrous Sulfate 325mg po qd - bid         NSAIDs for 10-14 days        f/u Dr. Lewis for repeat Echo in 3-4 weeks     f/u GI for C-Scope to r/o Celiac    f/u Heme for anemia w/up conclusion - results still pending for electrophoresis etc         Pt verbalizes understand of f/u

## 2019-10-31 NOTE — DISCHARGE NOTE NURSING/CASE MANAGEMENT/SOCIAL WORK - NSDCFUADDAPPT_GEN_ALL_CORE_FT
Please follow-up with your new primary care provider at [address] [place] [address] on November 14, 2019 at 8:30 AM.

## 2019-10-31 NOTE — DISCHARGE NOTE PROVIDER - CARE PROVIDERS DIRECT ADDRESSES
,[email or web address]

## 2019-10-31 NOTE — DISCHARGE NOTE PROVIDER - NSDCCPCAREPLAN_GEN_ALL_CORE_FT
PRINCIPAL DISCHARGE DIAGNOSIS  Diagnosis: Pericarditis  Assessment and Plan of Treatment: you were evaluated by a cardiologist whom you should follow-up with closely; continue taking Colchicine and indomethacine as instructed and follow up with your primary care doctor and cardiologist to have repeat blood-work to evaluate your pericarditis; please refrain from strenous physical activity until you are re-assessed by a cardiologist      SECONDARY DISCHARGE DIAGNOSES  Diagnosis: Iron deficiency anemia  Assessment and Plan of Treatment: you received one dose of IV iron before discharge; please follow-up with your primary doctor for further evaluation    Diagnosis: Folic acid deficiency  Assessment and Plan of Treatment: continue taking oral folic acid supplement and follow-up with your primary care provider for further evaluation PRINCIPAL DISCHARGE DIAGNOSIS  Diagnosis: Pericarditis  Assessment and Plan of Treatment: you were evaluated by a cardiologist whom you should follow-up with closely; continue taking Colchicine and indomethacine as instructed and follow up with your primary care doctor and cardiologist to have repeat blood-work to evaluate your pericarditis; please refrain from strenous physical activity until you are re-assessed by a cardiologist; you have an appointment with your new primary care provider at [address] [place] [address] on November 14, 2019 at 8:30 AM.      SECONDARY DISCHARGE DIAGNOSES  Diagnosis: Iron deficiency anemia  Assessment and Plan of Treatment: you received one dose of IV iron before discharge; please follow-up with your primary doctor for further evaluation    Diagnosis: Folic acid deficiency  Assessment and Plan of Treatment: continue taking oral folic acid supplement and follow-up with your primary care provider for further evaluation

## 2019-11-01 ENCOUNTER — INBOUND DOCUMENT (OUTPATIENT)
Age: 24
End: 2019-11-01

## 2019-11-01 LAB
C3 SERPL-MCNC: 146 MG/DL — SIGNIFICANT CHANGE UP (ref 81–157)
C4 SERPL-MCNC: 23 MG/DL — SIGNIFICANT CHANGE UP (ref 13–39)
HBV CORE IGM SER-ACNC: SIGNIFICANT CHANGE UP
HBV SURFACE AB SER-ACNC: REACTIVE
HBV SURFACE AG SER-ACNC: SIGNIFICANT CHANGE UP
HCV AB S/CO SERPL IA: 0.19 S/CO — SIGNIFICANT CHANGE UP (ref 0–0.99)
HCV AB SERPL-IMP: SIGNIFICANT CHANGE UP

## 2019-11-02 LAB
ANTI-RIBONUCLEAR PROTEIN: 1.4 AI — HIGH
DSDNA AB FLD-ACNC: 0.6 AI — SIGNIFICANT CHANGE UP
ENA SCL70 AB SER-ACNC: 0.6 AI — SIGNIFICANT CHANGE UP
ENA SM AB FLD QL: 0.3 AI — SIGNIFICANT CHANGE UP
ENA SS-A AB FLD IA-ACNC: >8 AI — HIGH

## 2019-11-04 DIAGNOSIS — D56.9 THALASSEMIA, UNSPECIFIED: ICD-10-CM

## 2019-11-04 DIAGNOSIS — Z28.21 IMMUNIZATION NOT CARRIED OUT BECAUSE OF PATIENT REFUSAL: ICD-10-CM

## 2019-11-04 DIAGNOSIS — I31.3 PERICARDIAL EFFUSION (NONINFLAMMATORY): ICD-10-CM

## 2019-11-04 DIAGNOSIS — R19.7 DIARRHEA, UNSPECIFIED: ICD-10-CM

## 2019-11-04 DIAGNOSIS — D50.9 IRON DEFICIENCY ANEMIA, UNSPECIFIED: ICD-10-CM

## 2019-11-04 DIAGNOSIS — R42 DIZZINESS AND GIDDINESS: ICD-10-CM

## 2019-11-04 DIAGNOSIS — R00.0 TACHYCARDIA, UNSPECIFIED: ICD-10-CM

## 2019-11-04 DIAGNOSIS — K90.0 CELIAC DISEASE: ICD-10-CM

## 2019-11-04 DIAGNOSIS — I31.9 DISEASE OF PERICARDIUM, UNSPECIFIED: ICD-10-CM

## 2019-11-04 DIAGNOSIS — R59.0 LOCALIZED ENLARGED LYMPH NODES: ICD-10-CM

## 2019-11-04 DIAGNOSIS — D72.819 DECREASED WHITE BLOOD CELL COUNT, UNSPECIFIED: ICD-10-CM

## 2019-11-04 DIAGNOSIS — E53.8 DEFICIENCY OF OTHER SPECIFIED B GROUP VITAMINS: ICD-10-CM

## 2019-11-04 DIAGNOSIS — R07.9 CHEST PAIN, UNSPECIFIED: ICD-10-CM

## 2019-11-04 LAB — DSDNA AB SER-ACNC: 153 IU/ML — HIGH

## 2019-11-05 LAB
HOMOCYSTEINE LEVEL: 12 UMOL/L — HIGH
METHYLMALONIC ACID LEVEL: 66 NMOL/L — LOW (ref 87–318)

## 2019-11-12 ENCOUNTER — OUTPATIENT (OUTPATIENT)
Dept: OUTPATIENT SERVICES | Facility: HOSPITAL | Age: 24
LOS: 1 days | Discharge: HOME | End: 2019-11-12
Payer: SUBSIDIZED

## 2019-11-12 ENCOUNTER — APPOINTMENT (OUTPATIENT)
Dept: INTERNAL MEDICINE | Facility: CLINIC | Age: 24
End: 2019-11-12
Payer: COMMERCIAL

## 2019-11-12 ENCOUNTER — OUTPATIENT (OUTPATIENT)
Dept: OUTPATIENT SERVICES | Facility: HOSPITAL | Age: 24
LOS: 1 days | Discharge: HOME | End: 2019-11-12

## 2019-11-12 ENCOUNTER — TRANSCRIPTION ENCOUNTER (OUTPATIENT)
Age: 24
End: 2019-11-12

## 2019-11-12 VITALS
HEART RATE: 132 BPM | DIASTOLIC BLOOD PRESSURE: 82 MMHG | TEMPERATURE: 98.2 F | BODY MASS INDEX: 26.97 KG/M2 | WEIGHT: 180 LBS | HEIGHT: 68.5 IN | SYSTOLIC BLOOD PRESSURE: 127 MMHG

## 2019-11-12 DIAGNOSIS — Z00.00 ENCOUNTER FOR GENERAL ADULT MEDICAL EXAMINATION W/OUT ABNORMAL FINDINGS: ICD-10-CM

## 2019-11-12 DIAGNOSIS — Z78.9 OTHER SPECIFIED HEALTH STATUS: ICD-10-CM

## 2019-11-12 DIAGNOSIS — R10.11 RIGHT UPPER QUADRANT PAIN: ICD-10-CM

## 2019-11-12 DIAGNOSIS — R00.0 TACHYCARDIA, UNSPECIFIED: ICD-10-CM

## 2019-11-12 DIAGNOSIS — Z80.3 FAMILY HISTORY OF MALIGNANT NEOPLASM OF BREAST: ICD-10-CM

## 2019-11-12 PROCEDURE — 76700 US EXAM ABDOM COMPLETE: CPT | Mod: 26

## 2019-11-12 PROCEDURE — 99204 OFFICE O/P NEW MOD 45 MIN: CPT

## 2019-11-12 RX ORDER — MECLIZINE HYDROCHLORIDE 25 MG/1
25 TABLET ORAL
Refills: 0 | Status: ACTIVE | COMMUNITY

## 2019-11-12 RX ORDER — CYANOCOBALAMIN 1000 UG/ML
1000 INJECTION INTRAMUSCULAR; SUBCUTANEOUS
Qty: 1 | Refills: 5 | Status: ACTIVE | COMMUNITY
Start: 2019-11-12 | End: 1900-01-01

## 2019-11-12 RX ORDER — METOPROLOL SUCCINATE 25 MG/1
25 TABLET, EXTENDED RELEASE ORAL
Refills: 0 | Status: ACTIVE | COMMUNITY

## 2019-11-14 PROBLEM — Z00.00 ENCOUNTER FOR PREVENTIVE HEALTH EXAMINATION: Status: ACTIVE | Noted: 2019-11-01

## 2019-11-14 PROBLEM — R00.0 TACHYCARDIA: Status: ACTIVE | Noted: 2019-11-14

## 2019-11-14 NOTE — HISTORY OF PRESENT ILLNESS
[FreeTextEntry1] : Establish care [de-identified] : A 24-year-old female with a prior history of pericarditis presented with chest\par pain for about one week. Pain is aggravated by deep inspiration, lying down\par and movement. Pain is partially relieved by leaning forward and sitting down.\par Pain is associated with difficulty breathing and nausea. In the ED her EKG\par showed sinus tachycardia. D-Dimer elevated. CT angio ruled out PE, but showed\par moderate pericardial effusion. The patient was evaluated by cardiology and was\par started on Colchicine and NSAIDs. A 2D echo revealed normal LV EF and a\par small-to-moderate sized pericardial effusion. In addition she was evaluated by\par rheumatology due to the suspicion of autoimmune etiology of her symptoms;\par autoimmune blood-work was sent. She was evaluated by hematology for her\par microcytic anemia. She was found to be iron deficient and was ordered to\par received IV iron today. Her symptoms have improved since admission and she is\par planned to be discharged home with instructions to continue Colchicine and\par taper NSAIDs as well as close cardiology follow-up.\par \par Overall pt is aware she needs to f/u with hematology, possible GI eval in\par office to r/o Celiac Dz and also f/u with PCP - Referred to Dr. ROMERO in [facility]\par [facility]\par \[facility]

## 2019-11-14 NOTE — ASSESSMENT
[FreeTextEntry1] : This is a 24 year old F patient with PMHx of recurrent pericarditis presenting to establish care\par \par #Tachycardia\par -Increase the metoprolol dose to 50 mg PO daily\par \par #Anemia\par -Will give B12 and Iron and follow up hematology\par -Rheumatologic in nature so follow up with Rheumatology \par \par #Health maintainance\par -Follow up in 5 weeks

## 2019-11-14 NOTE — REVIEW OF SYSTEMS
[Chills] : chills [Fatigue] : fatigue [Night Sweats] : night sweats [Recent Change In Weight] : ~T recent weight change [Nosebleed] : nosebleed [Chest Pain] : chest pain [Palpitations] : palpitations [Shortness Of Breath] : shortness of breath [Wheezing] : wheezing [Cough] : cough [Abdominal Pain] : abdominal pain [Dyspnea on Exertion] : dyspnea on exertion [Nausea] : nausea [Diarrhea] : diarrhea [Headache] : headache [Insomnia] : insomnia [Easy Bruising] : easy bruising [Fever] : no fever [Hot Flashes] : no hot flashes [Discharge] : no discharge [Pain] : no pain [Redness] : no redness [Dryness] : no dryness  [Vision Problems] : no vision problems [Itching] : no itching [Earache] : no earache [Hearing Loss] : no hearing loss [Leg Claudication] : no leg claudication [Lower Ext Edema] : no lower extremity edema [Orthopnea] : no orthopnea [Paroxysmal Nocturnal Dyspnea] : no paroxysmal nocturnal dyspnea [Constipation] : no constipation [Vomiting] : no vomiting [Heartburn] : no heartburn [Melena] : no melena [Dysuria] : no dysuria [Incontinence] : no incontinence [Nocturia] : no nocturia [Poor Libido] : libido not poor [Hematuria] : no hematuria [Frequency] : no frequency [Joint Pain] : no joint pain [Joint Stiffness] : no joint stiffness [Joint Swelling] : no joint swelling [Muscle Weakness] : no muscle weakness [Muscle Pain] : no muscle pain [Back Pain] : no back pain [Itching] : no itching [Hair Changes] : no hair changes [Skin Rash] : no skin rash [Dizziness] : no dizziness [Fainting] : no fainting [Confusion] : no confusion [Anxiety] : no anxiety

## 2019-11-18 ENCOUNTER — OUTPATIENT (OUTPATIENT)
Dept: OUTPATIENT SERVICES | Facility: HOSPITAL | Age: 24
LOS: 1 days | Discharge: HOME | End: 2019-11-18

## 2019-11-18 ENCOUNTER — APPOINTMENT (OUTPATIENT)
Dept: CARDIOLOGY | Facility: CLINIC | Age: 24
End: 2019-11-18
Payer: COMMERCIAL

## 2019-11-18 VITALS
WEIGHT: 180 LBS | BODY MASS INDEX: 26.97 KG/M2 | HEART RATE: 108 BPM | SYSTOLIC BLOOD PRESSURE: 118 MMHG | DIASTOLIC BLOOD PRESSURE: 79 MMHG | HEIGHT: 68.5 IN

## 2019-11-18 DIAGNOSIS — I31.3 PERICARDIAL EFFUSION (NONINFLAMMATORY): ICD-10-CM

## 2019-11-18 PROCEDURE — XXXXX: CPT

## 2019-11-18 RX ORDER — INDOMETHACIN 25 MG/1
25 CAPSULE ORAL
Refills: 0 | Status: DISCONTINUED | COMMUNITY
End: 2019-11-18

## 2019-11-18 NOTE — REASON FOR VISIT
[Follow-Up - Clinic] : a clinic follow-up of [FreeTextEntry1] : This patient is 23 y/o female with past medical history of pericarditis secondary to lupus complicated with pericardial effusion. She is here for a follow up visit. She reports that she stopped taking colchicine and indomethacin because she started having nausea, vomiting, abdominal pain and diarrhea. She still reports mild pericardial pain but reports her shortness of breath is still limiting her daily activities.

## 2019-11-18 NOTE — REVIEW OF SYSTEMS
[Dyspnea on exertion] : dyspnea during exertion [Chest Pain] : chest pain [Negative] : Genitourinary

## 2019-11-18 NOTE — PHYSICAL EXAM
[General Appearance - Well Developed] : well developed [Heart Rate And Rhythm] : heart rate and rhythm were normal [FreeTextEntry1] : No JVD [Murmurs] : no murmurs present [Heart Sounds] : normal S1 and S2 [Edema] : no peripheral edema present [Arterial Pulses Normal] : the arterial pulses were normal [] : no respiratory distress [Abdomen Soft] : soft

## 2019-11-18 NOTE — ASSESSMENT
[FreeTextEntry1] : This patient is 25 y/o female with past medical history of pericarditis secondary to lupus complicated with pericardial effusion. She is here for a follow up visit. She reports that she stopped taking colchicine and indomethacin because she started having nausea, vomiting, abdominal pain and diarrhea. She still reports mild pericardial pain but reports her shortness of breath is still limiting her daily activities. \par \par Assessment and plan \par \par 1- Pericarditis and pericardial effusion \par - Pain is much better. We will continue 0.3mg of colchicine QD and stop indomethacin \par - Will order repeat 2D ECHO \par - Will order Aspirin 325mg to take daily. Continue metoprolol 25mg for 1 week and then stop \par - We will recheck ESR and CRP \par - Will do in office EKG \par - Patient should be seen by rheumatologist \par - Follow up in 6 weeks

## 2019-12-03 ENCOUNTER — FORM ENCOUNTER (OUTPATIENT)
Age: 24
End: 2019-12-03

## 2019-12-04 ENCOUNTER — OUTPATIENT (OUTPATIENT)
Dept: OUTPATIENT SERVICES | Facility: HOSPITAL | Age: 24
LOS: 1 days | Discharge: HOME | End: 2019-12-04
Payer: SUBSIDIZED

## 2019-12-04 DIAGNOSIS — N63.10 UNSPECIFIED LUMP IN THE RIGHT BREAST, UNSPECIFIED QUADRANT: ICD-10-CM

## 2019-12-04 DIAGNOSIS — N63.20 UNSPECIFIED LUMP IN THE LEFT BREAST, UNSPECIFIED QUADRANT: ICD-10-CM

## 2019-12-04 PROCEDURE — 76642 ULTRASOUND BREAST LIMITED: CPT | Mod: 26,RT

## 2019-12-05 ENCOUNTER — OUTPATIENT (OUTPATIENT)
Dept: OUTPATIENT SERVICES | Facility: HOSPITAL | Age: 24
LOS: 1 days | Discharge: HOME | End: 2019-12-05

## 2019-12-05 ENCOUNTER — APPOINTMENT (OUTPATIENT)
Dept: HEMATOLOGY ONCOLOGY | Facility: CLINIC | Age: 24
End: 2019-12-05
Payer: COMMERCIAL

## 2019-12-05 VITALS
DIASTOLIC BLOOD PRESSURE: 77 MMHG | TEMPERATURE: 98.2 F | HEART RATE: 76 BPM | WEIGHT: 178 LBS | HEIGHT: 68 IN | SYSTOLIC BLOOD PRESSURE: 114 MMHG | BODY MASS INDEX: 26.98 KG/M2

## 2019-12-05 DIAGNOSIS — Z86.79 PERSONAL HISTORY OF OTHER DISEASES OF THE CIRCULATORY SYSTEM: ICD-10-CM

## 2019-12-05 PROCEDURE — 99203 OFFICE O/P NEW LOW 30 MIN: CPT

## 2019-12-17 PROBLEM — Z86.79 HISTORY OF PERICARDITIS: Status: RESOLVED | Noted: 2019-12-17 | Resolved: 2019-12-17

## 2019-12-17 NOTE — HISTORY OF PRESENT ILLNESS
[de-identified] : 23 yo female is referred for evaluation and management of microcytic anemia. She has a history of RA and pericarditis and has been on Prednisone. She has been of biologics due to low blood counts. Her previous blood work showed microcytic anemia ranging between 7 to 8 g/dl. WBC and PLT were normal. Her kidney function and liver function were normal. Serum Fe, % saturation were low. LDH, Haptoglobin and B12 were normal. Folate was borderline. MYA was 1:1280 in speckled pattern. RF was elevated at 91, dsDNA was 153.  She tried oral iron supplement but developed diarrhea. She denies any sign of bleeding. her periods are not heavy.

## 2019-12-17 NOTE — CONSULT LETTER
[Dear  ___] : Dear  [unfilled], [Please see my note below.] : Please see my note below. [Consult Letter:] : I had the pleasure of evaluating your patient, [unfilled]. [Consult Closing:] : Thank you very much for allowing me to participate in the care of this patient.  If you have any questions, please do not hesitate to contact me. [Sincerely,] : Sincerely, [FreeTextEntry3] : Vivian Berry MD

## 2019-12-17 NOTE — ASSESSMENT
[FreeTextEntry1] : 1. Microcytic anemia, likely due to combination of iron deficiency and anemia of chronic disease.\par 2. RA and pericarditis. \par \par Recommendation:\par -- Will order blood work including CBC, retic count, blood smear, iron study, B12, Folate.\par -- Given she can not tolerate oral iron supplement, will schedule her Venofer 200 mg iv weekly x 5. Benefit and side effects were reviewed.\par -- Followup with Rheumatologist for pericarditis. \par -- Consider GI evaluation to r/u occult bleeding.\par -- RTO for re-evaluation in 2 months.

## 2019-12-20 ENCOUNTER — APPOINTMENT (OUTPATIENT)
Dept: INFUSION THERAPY | Facility: CLINIC | Age: 24
End: 2019-12-20

## 2019-12-30 ENCOUNTER — APPOINTMENT (OUTPATIENT)
Dept: CARDIOLOGY | Facility: CLINIC | Age: 24
End: 2019-12-30

## 2020-01-06 DIAGNOSIS — D50.9 IRON DEFICIENCY ANEMIA, UNSPECIFIED: ICD-10-CM

## 2020-01-08 LAB
ANION GAP SERPL CALC-SCNC: 9 MMOL/L
BASOPHILS # BLD AUTO: 0.01 K/UL
BASOPHILS NFR BLD AUTO: 0.2 %
BUN SERPL-MCNC: 8 MG/DL
CALCIUM SERPL-MCNC: 9.6 MG/DL
CHLORIDE SERPL-SCNC: 101 MMOL/L
CO2 SERPL-SCNC: 25 MMOL/L
CREAT SERPL-MCNC: 0.7 MG/DL
EOSINOPHIL # BLD AUTO: 0.02 K/UL
EOSINOPHIL NFR BLD AUTO: 0.5 %
GLUCOSE SERPL-MCNC: 86 MG/DL
HCT VFR BLD CALC: 32.3 %
HGB BLD-MCNC: 9.9 G/DL
IMM GRANULOCYTES NFR BLD AUTO: 0.2 %
LYMPHOCYTES # BLD AUTO: 1.05 K/UL
LYMPHOCYTES NFR BLD AUTO: 24 %
MAN DIFF?: NORMAL
MCHC RBC-ENTMCNC: 22.4 PG
MCHC RBC-ENTMCNC: 30.7 G/DL
MCV RBC AUTO: 73.2 FL
MONOCYTES # BLD AUTO: 0.32 K/UL
MONOCYTES NFR BLD AUTO: 7.3 %
NEUTROPHILS # BLD AUTO: 2.97 K/UL
NEUTROPHILS NFR BLD AUTO: 67.8 %
PLATELET # BLD AUTO: 314 K/UL
POTASSIUM SERPL-SCNC: 4.5 MMOL/L
RBC # BLD: 4.41 M/UL
RBC # FLD: 22.2 %
SODIUM SERPL-SCNC: 135 MMOL/L
WBC # FLD AUTO: 4.38 K/UL

## 2020-01-09 ENCOUNTER — APPOINTMENT (OUTPATIENT)
Dept: RHEUMATOLOGY | Facility: CLINIC | Age: 25
End: 2020-01-09
Payer: COMMERCIAL

## 2020-01-09 ENCOUNTER — OUTPATIENT (OUTPATIENT)
Dept: OUTPATIENT SERVICES | Facility: HOSPITAL | Age: 25
LOS: 1 days | Discharge: HOME | End: 2020-01-09

## 2020-01-09 ENCOUNTER — LABORATORY RESULT (OUTPATIENT)
Age: 25
End: 2020-01-09

## 2020-01-09 VITALS
DIASTOLIC BLOOD PRESSURE: 78 MMHG | HEART RATE: 89 BPM | BODY MASS INDEX: 26.83 KG/M2 | SYSTOLIC BLOOD PRESSURE: 131 MMHG | HEIGHT: 68 IN | WEIGHT: 177 LBS

## 2020-01-09 DIAGNOSIS — M32.9 SYSTEMIC LUPUS ERYTHEMATOSUS, UNSPECIFIED: ICD-10-CM

## 2020-01-09 DIAGNOSIS — M32.12 PERICARDITIS IN SYSTEMIC LUPUS ERYTHEMATOSUS: ICD-10-CM

## 2020-01-09 PROCEDURE — 99214 OFFICE O/P EST MOD 30 MIN: CPT

## 2020-01-10 PROBLEM — M32.9 HISTORY OF SYSTEMIC LUPUS ERYTHEMATOSUS (SLE): Status: RESOLVED | Noted: 2020-01-10 | Resolved: 2020-01-10

## 2020-01-10 RX ORDER — COLCHICINE 0.6 MG/1
0.6 TABLET ORAL DAILY
Qty: 30 | Refills: 0 | Status: ACTIVE | COMMUNITY
Start: 1900-01-01 | End: 1900-01-01

## 2020-01-10 NOTE — HISTORY OF PRESENT ILLNESS
[FreeTextEntry1] : 24 year old female with PMH of recently diagnosed SLE c/b recurrent pericarditis/pericardial effusion with recent hospitalization due to this in 11/2019 and iron deficiency anemia presents today for initial outpatient evaluation. Was seen in hospital at [facility]- records reviewed.\par \par 10/31/19: CCP negative, unremarkable hand XRs, negative endomysial IgA (suspected gluten sensitivity given reports of rash and diarrhea after certain food triggers), elevated total serum IgA (678) ref: , elevated dsDNA (153), elevated anti SS-A (>8.0), negative SS-B, elevated anti-ribonuclear protein (1.4), elevated homocysteine (12), decreased methylmalonic acid (66), elevated RF (91), elevated homogenous, speckled MYA (1:1280). ESR while hospitalized: 128 on presentation --> 105 --> 19 prior to discharge. ESR repeated as outpatient and found to be 82 1/8/2020.\par \par Was not started on any rheumatologic medication on discharge from hospital. Has followed-up with Cardiology (Dr. Johnson) who prescribed Colchicine and discontinued Indomethacin; patient has ran out of Colchicine since mid-December. Repeat TTE scheduled after this Rheumatology appointment. Taking  mg qD, Metoprolol succinate 25 mg qD. Has also followed up with Heme/Onc for NATALIE: recommended IV Venofer 200 mg weekly (cannot tolerate PO) but patient has not yet been able to complete this.\par \par Reports bilateral hand pain and swelling as well as achy knees and R arm associated with morning stiffness for minutes and rarely for 1-2 hours. Reports occasional shortness of breath when waking up from bed. Also reports mid-sternal chest pain similar to her previous pericarditis bouts a/w chest pressure that she describes as a "baby sitting on her chest." Pain is relieved with leaning forward. Denies recent URI or cough, fevers/chills. Denies abdominal pain, constipation, diarrhea, bloody BM's. Reports very irregular menstrual cycles: last 5 days, no abnormal excessive bleeding, was monthly but now every other month; does not follow with OBGYN. No history of pregnancies/miscarriages.

## 2020-01-10 NOTE — END OF VISIT
[] : Resident [FreeTextEntry3] : 25 yo F with new diagnosis of SLE (MYA 1:1280 homogenous/speckled, dsDNA, SSA, RF, RNP, recurrent pericarditis, arthralgias) presenting for hospital follow up. Having mild return of chest pressure not to the same extent as just before her hospitalization, awaiting refill of her colchicine. Plans for repeat echo with return to cardiology. Also describes some new hand arthralgias with unremarkable exam. Would start  mg BID - discussed potential side effects with patient including rare ocular toxicity, pt open to starting medication, rx given. Will update labs CBC, CMP complements, inflammatory markers, dsDNA, UA/UPC. Check TMPT enzyme anticipating potential need for AZA addition. Recommend cardiac MRI. Follow up with hematology for iron supplementation. Follow up 6 weeks.

## 2020-01-10 NOTE — PHYSICAL EXAM
[General Appearance - Alert] : alert [General Appearance - Well Developed] : well developed [General Appearance - In No Acute Distress] : in no acute distress [General Appearance - Well Nourished] : well nourished [Sclera] : the sclera and conjunctiva were normal [Neck Appearance] : the appearance of the neck was normal [Respiration, Rhythm And Depth] : normal respiratory rhythm and effort [Auscultation Breath Sounds / Voice Sounds] : lungs were clear to auscultation bilaterally [Heart Sounds] : normal S1 and S2 [Heart Rate And Rhythm] : heart rate was normal and rhythm regular [Musculoskeletal - Swelling] : no joint swelling seen [Abnormal Walk] : normal gait [Skin Color & Pigmentation] : normal skin color and pigmentation [Motor Tone] : muscle strength and tone were normal [Oriented To Time, Place, And Person] : oriented to person, place, and time [Impaired Insight] : insight and judgment were intact [] : the neck was supple [Heart Sounds Pericardial Friction Rub] : no pericardial rub [Abdomen Soft] : soft [FreeTextEntry1] : No active synovitis or joint effusions. No joint tenderness or significant deformities. Normal passive ROM bilateral wrists, elbows, shoulders, knees, ankles.

## 2020-01-10 NOTE — ASSESSMENT
[FreeTextEntry1] : 24 year old female with PMH of recently diagnosed SLE c/b recurrent pericarditis/pericardial effusion with recent hospitalization due to this in 11/2019 and iron deficiency anemia presents today for initial outpatient evaluation\par \par # SLE with recurrent pericarditis \par - Recent hospitalization for pericardial effusion 10/2019. XRs at time for hand and feet b/l negative\par - Will start Plaquenil today. Counseled patient this may take 3-6 months to achieve maximal effect. Will re-prescribe Colchicine \par - Continue f/u with Cardiology and repeat TTE as ordered. Will obtain cardiac MR to further characterize for myocarditis\par - Will obtain labs today: ESR, C3, C4, CBC, CMP, CK, dsDNA, Hep B Core AB, TPMT enzyme, urine protein/Cr ratio, quantiferon TB, UA\par - F/u in 6 weeks for lab evaluation and further care\par \par # Iron deficiency anemia\par - Continue f/u with Heme/Onc and advised to begin IV Venofer weekly infusions Cryotherapy Text: The wound bed was treated with cryotherapy after the biopsy was performed.

## 2020-01-14 LAB
ALBUMIN SERPL ELPH-MCNC: 3.9 G/DL
ALP BLD-CCNC: 81 U/L
ALT SERPL-CCNC: 18 U/L
ANION GAP SERPL CALC-SCNC: 12 MMOL/L
AST SERPL-CCNC: 25 U/L
BASOPHILS # BLD AUTO: 0.01 K/UL
BASOPHILS NFR BLD AUTO: 0.2 %
BILIRUB SERPL-MCNC: 0.4 MG/DL
BUN SERPL-MCNC: 10 MG/DL
C3 SERPL-MCNC: 120 MG/DL
C4 SERPL-MCNC: 20 MG/DL
CALCIUM SERPL-MCNC: 8.8 MG/DL
CHLORIDE SERPL-SCNC: 97 MMOL/L
CK SERPL-CCNC: 59 U/L
CO2 SERPL-SCNC: 23 MMOL/L
CREAT SERPL-MCNC: 0.7 MG/DL
CREAT SPEC-SCNC: 168 MG/DL
CREAT/PROT UR: 0.1 RATIO
DSDNA AB SER-ACNC: 198 IU/ML
EOSINOPHIL # BLD AUTO: 0.01 K/UL
EOSINOPHIL NFR BLD AUTO: 0.2 %
ERYTHROCYTE [SEDIMENTATION RATE] IN BLOOD BY WESTERGREN METHOD: 126 MM/HR
GLUCOSE SERPL-MCNC: 84 MG/DL
HBV CORE IGG+IGM SER QL: NONREACTIVE
HCT VFR BLD CALC: 29.2 %
HGB BLD-MCNC: 9.1 G/DL
IMM GRANULOCYTES NFR BLD AUTO: 0.2 %
LYMPHOCYTES # BLD AUTO: 1.02 K/UL
LYMPHOCYTES NFR BLD AUTO: 21.1 %
M TB IFN-G BLD-IMP: NEGATIVE
MAN DIFF?: NORMAL
MCHC RBC-ENTMCNC: 22.4 PG
MCHC RBC-ENTMCNC: 31.2 G/DL
MCV RBC AUTO: 71.7 FL
MONOCYTES # BLD AUTO: 0.38 K/UL
MONOCYTES NFR BLD AUTO: 7.9 %
NEUTROPHILS # BLD AUTO: 3.41 K/UL
NEUTROPHILS NFR BLD AUTO: 70.4 %
PLATELET # BLD AUTO: 242 K/UL
POTASSIUM SERPL-SCNC: 3.7 MMOL/L
PROT SERPL-MCNC: 10.2 G/DL
PROT UR-MCNC: 22 MG/DLG/24H
QUANTIFERON TB PLUS MITOGEN MINUS NIL: 3.19 IU/ML
QUANTIFERON TB PLUS NIL: 0.01 IU/ML
QUANTIFERON TB PLUS TB1 MINUS NIL: 0.01 IU/ML
QUANTIFERON TB PLUS TB2 MINUS NIL: 0 IU/ML
RBC # BLD: 4.07 M/UL
RBC # FLD: 21.8 %
SODIUM SERPL-SCNC: 132 MMOL/L
WBC # FLD AUTO: 4.84 K/UL

## 2020-01-21 LAB
TPMT ENZYME INTERPRETATION: NORMAL
TPMT ENZYME METHODOLOGY: NORMAL
TPMT ENZYME: 18.2

## 2020-01-24 ENCOUNTER — APPOINTMENT (OUTPATIENT)
Dept: INTERNAL MEDICINE | Facility: CLINIC | Age: 25
End: 2020-01-24
Payer: COMMERCIAL

## 2020-01-24 ENCOUNTER — OUTPATIENT (OUTPATIENT)
Dept: OUTPATIENT SERVICES | Facility: HOSPITAL | Age: 25
LOS: 1 days | Discharge: HOME | End: 2020-01-24

## 2020-01-24 VITALS
DIASTOLIC BLOOD PRESSURE: 71 MMHG | WEIGHT: 175 LBS | HEART RATE: 69 BPM | BODY MASS INDEX: 26.52 KG/M2 | HEIGHT: 68 IN | SYSTOLIC BLOOD PRESSURE: 109 MMHG

## 2020-01-24 PROCEDURE — 99213 OFFICE O/P EST LOW 20 MIN: CPT | Mod: GC

## 2020-01-24 NOTE — PHYSICAL EXAM
[No Acute Distress] : no acute distress [Well Nourished] : well nourished [Well Developed] : well developed [Normal] : the outer ears and nose were normal in appearance and the oropharynx was normal [No JVD] : no jugular venous distention [Supple] : supple [No Respiratory Distress] : no respiratory distress  [No Accessory Muscle Use] : no accessory muscle use [Clear to Auscultation] : lungs were clear to auscultation bilaterally [Normal Rate] : normal rate  [Regular Rhythm] : with a regular rhythm [Normal S1, S2] : normal S1 and S2 [Soft] : abdomen soft [Non Tender] : non-tender [Non-distended] : non-distended

## 2020-01-27 ENCOUNTER — OUTPATIENT (OUTPATIENT)
Dept: OUTPATIENT SERVICES | Facility: HOSPITAL | Age: 25
LOS: 1 days | Discharge: HOME | End: 2020-01-27

## 2020-01-28 DIAGNOSIS — K02.63 DENTAL CARIES ON SMOOTH SURFACE PENETRATING INTO PULP: ICD-10-CM

## 2020-01-29 NOTE — REVIEW OF SYSTEMS
[Chest Pain] : chest pain [Palpitations] : palpitations [Diarrhea] : diarrhea [Negative] : Integumentary [Leg Claudication] : no leg claudication [Abdominal Pain] : no abdominal pain [Nausea] : no nausea [Constipation] : no constipation [Vomiting] : no vomiting [Heartburn] : no heartburn [Melena] : no melena

## 2020-01-29 NOTE — HISTORY OF PRESENT ILLNESS
[FreeTextEntry1] : Follow up visit  [de-identified] : A 24-year-old female with a prior history of pericarditis presented to the clinic today for a follow up. The patient was seen 5 weeks ago after a discharge from the hospital in which she was diagnosed with pericarditis. The patient has been following up with cardiology and rheum since then. The patient is also following up with the hematology clinic for anemia. Where they recommended started her on Iron IV weekly for 5 weeks as the patient is unable to tolerate oral iron.

## 2020-01-29 NOTE — ASSESSMENT
[FreeTextEntry1] : This is a 24 year old F patient with PMHx of recurrent pericarditis present for a regular F/U\par \par #Tachycardia\par -Increase the metoprolol dose to 50 mg PO daily\par -Follow up with cardiology \par -still complains of palpitations\par \par #SLE\par -On azathioprine and Hydroxychloroqine\par -F/U with Rheumatology \par \par #Diarrhea\par -Possibly from the colchicine \par -Will monitor for now \par \par #Anemia\par -Will give B12 and Iron and follow up hematology\par -Heme following up and they recommended Iron IV once a week for 5 weeks \par \par #Health maintainance\par -Follow up in 6 months

## 2020-02-03 DIAGNOSIS — M32.12 PERICARDITIS IN SYSTEMIC LUPUS ERYTHEMATOSUS: ICD-10-CM

## 2020-02-19 LAB
BASOPHILS # BLD AUTO: 0.02 K/UL
BASOPHILS NFR BLD AUTO: 0.3 %
EOSINOPHIL # BLD AUTO: 0.03 K/UL
EOSINOPHIL NFR BLD AUTO: 0.5 %
HCT VFR BLD CALC: 30.9 %
HGB BLD-MCNC: 9.5 G/DL
IMM GRANULOCYTES NFR BLD AUTO: 0.2 %
LYMPHOCYTES # BLD AUTO: 0.89 K/UL
LYMPHOCYTES NFR BLD AUTO: 15.3 %
MAN DIFF?: NORMAL
MCHC RBC-ENTMCNC: 22.7 PG
MCHC RBC-ENTMCNC: 30.7 G/DL
MCV RBC AUTO: 73.7 FL
MONOCYTES # BLD AUTO: 0.34 K/UL
MONOCYTES NFR BLD AUTO: 5.9 %
NEUTROPHILS # BLD AUTO: 4.52 K/UL
NEUTROPHILS NFR BLD AUTO: 77.8 %
PLATELET # BLD AUTO: 251 K/UL
RBC # BLD: 4.19 M/UL
RBC # FLD: 19.5 %
WBC # FLD AUTO: 5.81 K/UL

## 2020-02-20 ENCOUNTER — APPOINTMENT (OUTPATIENT)
Dept: RHEUMATOLOGY | Facility: CLINIC | Age: 25
End: 2020-02-20
Payer: COMMERCIAL

## 2020-02-20 ENCOUNTER — OUTPATIENT (OUTPATIENT)
Dept: OUTPATIENT SERVICES | Facility: HOSPITAL | Age: 25
LOS: 1 days | Discharge: HOME | End: 2020-02-20

## 2020-02-20 VITALS
WEIGHT: 180 LBS | DIASTOLIC BLOOD PRESSURE: 74 MMHG | BODY MASS INDEX: 27.28 KG/M2 | HEART RATE: 74 BPM | TEMPERATURE: 97.4 F | SYSTOLIC BLOOD PRESSURE: 114 MMHG | HEIGHT: 68 IN

## 2020-02-20 DIAGNOSIS — D50.9 IRON DEFICIENCY ANEMIA, UNSPECIFIED: ICD-10-CM

## 2020-02-20 DIAGNOSIS — I31.9 DISEASE OF PERICARDIUM, UNSPECIFIED: ICD-10-CM

## 2020-02-20 DIAGNOSIS — M32.12 PERICARDITIS IN SYSTEMIC LUPUS ERYTHEMATOSUS: ICD-10-CM

## 2020-02-20 LAB
ALBUMIN SERPL ELPH-MCNC: 3.9 G/DL
ALP BLD-CCNC: 76 U/L
ALT SERPL-CCNC: 20 U/L
ANION GAP SERPL CALC-SCNC: 13 MMOL/L
AST SERPL-CCNC: 24 U/L
BILIRUB SERPL-MCNC: 0.4 MG/DL
BUN SERPL-MCNC: 7 MG/DL
CALCIUM SERPL-MCNC: 9.6 MG/DL
CHLORIDE SERPL-SCNC: 98 MMOL/L
CO2 SERPL-SCNC: 24 MMOL/L
CREAT SERPL-MCNC: 0.7 MG/DL
GLUCOSE SERPL-MCNC: 79 MG/DL
POTASSIUM SERPL-SCNC: 4.1 MMOL/L
PROT SERPL-MCNC: 10.4 G/DL
SODIUM SERPL-SCNC: 135 MMOL/L

## 2020-02-20 PROCEDURE — 99214 OFFICE O/P EST MOD 30 MIN: CPT | Mod: GC

## 2020-02-20 NOTE — ASSESSMENT
[FreeTextEntry1] : 24 year old female with PMH of recently diagnosed SLE c/b recurrent pericarditis/pericardial effusion with recent hospitalization due to this in 11/2019 and iron deficiency anemia presents today for initial outpatient evaluation\par \par # SLE with recurrent pericarditis \par - Pt is moving to [place] next week. Counseled on transition of care.\par - No joint swelling, denies c/p\par - Recent hospitalization for pericardial effusion 10/2019. XRs at time for hand and feet b/l negative\par - C/w Azathioprine 50mg PO QD\par - Started Plaquenil. Reports occasional muscle cramping on legs since starting. Counseled patient this may take 3-6 months to achieve maximal effect.Will decrease dosage to 200mg QD. \par - Reports noncompliance due to diarrhea with Colchicine, will d/c, no recurrent symptoms at this time\par - Continue f/u with Cardiology and try to schedule echo prior to move \par - Given labs to take with her to [place] - plan to advance dose of AZA once she establishes health insurance in [place] - can increase AZA to 50 mg BID and recheck labs 2 weeks later. Encouraged her to schedule appointment with rheumatologist near her sister now so that she has an upcoming appointment. Advised to get medical records release form so that when she knows where she'll transition we can send her records/labs\par - F/u PRN \par \par # Iron deficiency anemia\par - Continue f/u with Heme/Onc and advised to begin IV Venofer weekly infusions, but has not started yet (should establish with hematology as well in [place])\par

## 2020-02-20 NOTE — HISTORY OF PRESENT ILLNESS
[FreeTextEntry1] : 25 yo F with PMHx of recently diagnosed SLE c/b recurrent pericarditis/pericardial effusion with recent hospitalization due to this in 11/2019 and iron deficiency anemia presents today for f/u. Last seen here Jan 2020. Currently taking AZA 50 mg daily without side effects. Taking colchicine 3-4 times/week still 2/2 hx of pericarditis. Stopped HCQ due to concern for potential LE cramping attributed to medication. \par \par 10/31/19: CCP negative, unremarkable hand XRs, negative endomysial IgA (suspected gluten sensitivity given reports of rash and diarrhea after certain food triggers), elevated total serum IgA (678) ref: , elevated dsDNA (153), elevated anti SS-A (>8.0), negative SS-B, elevated anti-ribonuclear protein (1.4), elevated homocysteine (12), elevated RF (91), elevated homogenous, speckled MYA (1:1280). ESR while hospitalized: 128 on presentation --> 105 --> 19 prior to discharge. ESR repeated as outpatient and found to be 82 1/8/2020.\par \par Has followed-up with Cardiology (Dr. Johnson) who prescribed Colchicine and discontinued Indomethacin; patient has ran out of Colchicine since mid-December. Repeat TTE scheduled after Jan 2020 Rheumatology appointment. Taking  mg qD, Metoprolol succinate 25 mg qD. Has also followed up with Heme/Onc for NATALIE: recommended IV Venofer 200 mg weekly (cannot tolerate PO) but patient has not yet been able to complete this.\par \par No significant arthralgias, joint swelling. Doesn't feel pericarditis symptoms previously experienced, taking colchicine about 3-4 times a week sporadically 2/2 diarrhea associated with the medication.Denies recent URI or cough, fevers/chills. Denies abdominal pain, constipation, bloody BM's. Reports very irregular menstrual cycles: last 5 days, no abnormal excessive bleeding, was monthly but now every other month; does not follow with OBGYN. No history of pregnancies/miscarriages. \par

## 2020-02-20 NOTE — END OF VISIT
[] : Resident [FreeTextEntry3] : 23 yo F with SLE (MYA 1:1280 homogenous/speckled, dsDNA, SSA, RF, low titer RNP, recurrent pericarditis, arthralgias) presenting for follow up - reports plan to move to [place] next week. Currently sporadically using colchicine 3-4 x/week and d/c'd HCQ (? experiencing leg cramps attributed to HCQ and has diarrhea from colchicine), taking AZA 50 mg daily without side effects. No current symptoms of pericarditis - encouraged patient to complete repeat TTE as planned if possible before moving, may stop colchicine for now since she's having loose stools with even one dose. Will hold on advancing AZA dose given uncertainty of how soon she'll have health insurance to get repeat labs with dose escalation. May continue AZA 50 mg daily for now (advised she cannot stay on this medication without routine monitoring - will call us to update on her status, given labs to collect once insurance is re-established and we can assist with AZA dose escalation). Recent labs from this month s/p initiation of AZA were stable. May trial restart HCQ at lower dose 200 mg daily. Encouraged calling near her sister in [place] to set appointments for establishing with rheum, cardiology, heme/onc (had been advised to start venofer for Fe-def anemia), PCP. Will have records sent to her new providers once she has appointments made. Encouraged her to reach out with any additional questions/concerns. Her BF will remain in the area for now 2/2 residency, so she will be visiting this area intermittently as well.

## 2020-02-20 NOTE — PHYSICAL EXAM
[General Appearance - Alert] : alert [General Appearance - In No Acute Distress] : in no acute distress [General Appearance - Well Nourished] : well nourished [General Appearance - Well Developed] : well developed [General Appearance - Well-Appearing] : healthy appearing [Sclera] : the sclera and conjunctiva were normal [Auscultation Breath Sounds / Voice Sounds] : lungs were clear to auscultation bilaterally [Heart Sounds] : normal S1 and S2 [Bowel Sounds] : normal bowel sounds [Abdomen Tenderness] : non-tender [Abdomen Mass (___ Cm)] : no abdominal mass palpated [Skin Color & Pigmentation] : normal skin color and pigmentation [Skin Lesions] : no skin lesions [Neck Appearance] : the appearance of the neck was normal [] : the neck was supple [Heart Rate And Rhythm] : heart rate was normal and rhythm regular [Heart Sounds Pericardial Friction Rub] : no pericardial rub [Abdomen Soft] : soft [Cervical Lymph Nodes Enlarged Posterior Bilaterally] : posterior cervical [Cervical Lymph Nodes Enlarged Anterior Bilaterally] : anterior cervical [Affect] : the affect was normal [Mood] : the mood was normal [FreeTextEntry1] : No active synovitis or joint effusions. No joint tenderness or significant deformities. Normal passive ROM bilateral wrists, elbows, shoulders, knees, ankles. No peripheral edema.

## 2020-05-01 LAB
CRP SERPL-MCNC: 0.31 MG/DL
ERYTHROCYTE [SEDIMENTATION RATE] IN BLOOD BY WESTERGREN METHOD: 82 MM/HR

## 2020-05-27 RX ORDER — FOLIC ACID 1 MG/1
1 TABLET ORAL DAILY
Qty: 30 | Refills: 3 | Status: ACTIVE | COMMUNITY
Start: 1900-01-01 | End: 1900-01-01

## 2020-05-27 RX ORDER — METOPROLOL SUCCINATE 50 MG/1
50 TABLET, EXTENDED RELEASE ORAL DAILY
Qty: 30 | Refills: 5 | Status: ACTIVE | COMMUNITY
Start: 2019-11-12 | End: 1900-01-01

## 2020-07-24 RX ORDER — AZATHIOPRINE 50 1/1
50 TABLET ORAL DAILY
Qty: 30 | Refills: 0 | Status: ACTIVE | COMMUNITY
Start: 2020-01-21 | End: 1900-01-01

## 2020-07-24 RX ORDER — HYDROXYCHLOROQUINE SULFATE 200 MG/1
200 TABLET, FILM COATED ORAL
Qty: 30 | Refills: 3 | Status: ACTIVE | COMMUNITY
Start: 2020-01-09 | End: 1900-01-01

## 2021-11-08 NOTE — PATIENT PROFILE ADULT - NSPROMUTINFOINDIVIDFT_GEN_A_NUR
What Type Of Note Output Would You Prefer (Optional)?: Bullet Format How Severe Is Your Skin Lesion?: mild Has Your Skin Lesion Been Treated?: not been treated Is This A New Presentation, Or A Follow-Up?: Skin Lesion Which Family Member (Optional)?: Father, grandma, aunt n/a

## 2023-12-13 NOTE — SBIRT NOTE ADULT - NSSBIRTINJURYRES_GEN_A_CORE
Received fax approval letter to hold eliquis 3-5 dys along with aspirin for procedure per Mikaela Hobbs PA-C   No

## 2025-03-10 NOTE — PATIENT PROFILE ADULT - PATIENT REPRESENTATIVE: ( YOU CAN CHOOSE ANY PERSON THAT CAN ASSIST YOU WITH YOUR HEALTH CARE PREFERENCES, DOES NOT HAVE TO BE A SPOUSE, IMMEDIATE FAMILY OR SIGNIFICANT OTHER/PARTNER)
Stop augmentin.     I sent over zithromax. Start that today, this evening.   I also sent zofran for nausea. Take that one hour before starting antibiotic so she can keep the first dose down and use as needed.     Push fluids, rest. FU PRN   declines